# Patient Record
Sex: MALE | Race: BLACK OR AFRICAN AMERICAN | NOT HISPANIC OR LATINO | Employment: OTHER | URBAN - METROPOLITAN AREA
[De-identification: names, ages, dates, MRNs, and addresses within clinical notes are randomized per-mention and may not be internally consistent; named-entity substitution may affect disease eponyms.]

---

## 2022-06-28 ENCOUNTER — OFFICE VISIT (OUTPATIENT)
Dept: FAMILY MEDICINE | Facility: CLINIC | Age: 67
End: 2022-06-28
Payer: MEDICARE

## 2022-06-28 VITALS
BODY MASS INDEX: 23.01 KG/M2 | TEMPERATURE: 98 F | HEART RATE: 74 BPM | DIASTOLIC BLOOD PRESSURE: 80 MMHG | WEIGHT: 173.63 LBS | SYSTOLIC BLOOD PRESSURE: 132 MMHG | RESPIRATION RATE: 14 BRPM | HEIGHT: 73 IN | OXYGEN SATURATION: 98 %

## 2022-06-28 DIAGNOSIS — G57.03 PIRIFORMIS SYNDROME OF BOTH SIDES: ICD-10-CM

## 2022-06-28 DIAGNOSIS — Z11.59 ENCOUNTER FOR HEPATITIS C SCREENING TEST FOR LOW RISK PATIENT: ICD-10-CM

## 2022-06-28 DIAGNOSIS — Z76.89 ENCOUNTER TO ESTABLISH CARE: Primary | ICD-10-CM

## 2022-06-28 DIAGNOSIS — Z13.6 ENCOUNTER FOR SCREENING FOR CARDIOVASCULAR DISORDERS: ICD-10-CM

## 2022-06-28 DIAGNOSIS — Z11.4 SCREENING FOR HIV (HUMAN IMMUNODEFICIENCY VIRUS): ICD-10-CM

## 2022-06-28 DIAGNOSIS — Z12.5 SCREENING FOR PROSTATE CANCER: ICD-10-CM

## 2022-06-28 DIAGNOSIS — Z00.00 ANNUAL PHYSICAL EXAM: ICD-10-CM

## 2022-06-28 PROCEDURE — 1125F AMNT PAIN NOTED PAIN PRSNT: CPT | Mod: CPTII,S$GLB,, | Performed by: FAMILY MEDICINE

## 2022-06-28 PROCEDURE — 3075F SYST BP GE 130 - 139MM HG: CPT | Mod: CPTII,S$GLB,, | Performed by: FAMILY MEDICINE

## 2022-06-28 PROCEDURE — 3008F BODY MASS INDEX DOCD: CPT | Mod: CPTII,S$GLB,, | Performed by: FAMILY MEDICINE

## 2022-06-28 PROCEDURE — 1160F PR REVIEW ALL MEDS BY PRESCRIBER/CLIN PHARMACIST DOCUMENTED: ICD-10-PCS | Mod: CPTII,S$GLB,, | Performed by: FAMILY MEDICINE

## 2022-06-28 PROCEDURE — 3288F FALL RISK ASSESSMENT DOCD: CPT | Mod: CPTII,S$GLB,, | Performed by: FAMILY MEDICINE

## 2022-06-28 PROCEDURE — 3288F PR FALLS RISK ASSESSMENT DOCUMENTED: ICD-10-PCS | Mod: CPTII,S$GLB,, | Performed by: FAMILY MEDICINE

## 2022-06-28 PROCEDURE — 1159F PR MEDICATION LIST DOCUMENTED IN MEDICAL RECORD: ICD-10-PCS | Mod: CPTII,S$GLB,, | Performed by: FAMILY MEDICINE

## 2022-06-28 PROCEDURE — 1125F PR PAIN SEVERITY QUANTIFIED, PAIN PRESENT: ICD-10-PCS | Mod: CPTII,S$GLB,, | Performed by: FAMILY MEDICINE

## 2022-06-28 PROCEDURE — 3008F PR BODY MASS INDEX (BMI) DOCUMENTED: ICD-10-PCS | Mod: CPTII,S$GLB,, | Performed by: FAMILY MEDICINE

## 2022-06-28 PROCEDURE — 99387 INIT PM E/M NEW PAT 65+ YRS: CPT | Mod: S$GLB,,, | Performed by: FAMILY MEDICINE

## 2022-06-28 PROCEDURE — 1101F PR PT FALLS ASSESS DOC 0-1 FALLS W/OUT INJ PAST YR: ICD-10-PCS | Mod: CPTII,S$GLB,, | Performed by: FAMILY MEDICINE

## 2022-06-28 PROCEDURE — 3079F PR MOST RECENT DIASTOLIC BLOOD PRESSURE 80-89 MM HG: ICD-10-PCS | Mod: CPTII,S$GLB,, | Performed by: FAMILY MEDICINE

## 2022-06-28 PROCEDURE — 99387 PR PREVENTIVE VISIT,NEW,65 & OVER: ICD-10-PCS | Mod: S$GLB,,, | Performed by: FAMILY MEDICINE

## 2022-06-28 PROCEDURE — 3079F DIAST BP 80-89 MM HG: CPT | Mod: CPTII,S$GLB,, | Performed by: FAMILY MEDICINE

## 2022-06-28 PROCEDURE — 3075F PR MOST RECENT SYSTOLIC BLOOD PRESS GE 130-139MM HG: ICD-10-PCS | Mod: CPTII,S$GLB,, | Performed by: FAMILY MEDICINE

## 2022-06-28 PROCEDURE — 1160F RVW MEDS BY RX/DR IN RCRD: CPT | Mod: CPTII,S$GLB,, | Performed by: FAMILY MEDICINE

## 2022-06-28 PROCEDURE — 99999 PR PBB SHADOW E&M-NEW PATIENT-LVL IV: CPT | Mod: PBBFAC,,, | Performed by: FAMILY MEDICINE

## 2022-06-28 PROCEDURE — 99999 PR PBB SHADOW E&M-NEW PATIENT-LVL IV: ICD-10-PCS | Mod: PBBFAC,,, | Performed by: FAMILY MEDICINE

## 2022-06-28 PROCEDURE — 1159F MED LIST DOCD IN RCRD: CPT | Mod: CPTII,S$GLB,, | Performed by: FAMILY MEDICINE

## 2022-06-28 PROCEDURE — 1101F PT FALLS ASSESS-DOCD LE1/YR: CPT | Mod: CPTII,S$GLB,, | Performed by: FAMILY MEDICINE

## 2022-06-28 RX ORDER — MELOXICAM 15 MG/1
15 TABLET ORAL DAILY PRN
Qty: 30 TABLET | Refills: 1 | Status: SHIPPED | OUTPATIENT
Start: 2022-06-28 | End: 2022-08-01

## 2022-06-28 NOTE — PROGRESS NOTES
"[facility] - Clinic Note    Subjective      Mr. Leon is a 66 y.o. male who presents to clinic for Establish Care and Back Pain    Patient reports that for last 3 years he has had intermittent pain in his buttocks that radiates down to his legs.  Over the last couple months seems to be worsening.  In the morning his legs feel fine but by the afternoon he has more pain.  He has not taken anything to help with his symptoms.  He reports that he had a colonoscopy about 30 years ago.  He is not interested in that currently.    PMH Wing has no past medical history on file.   PSXH Wing has no past surgical history on file.   FH Wing's family history is not on file.   SH Wing reports that he has quit smoking. He has never used smokeless tobacco. No history on file for alcohol use and drug use.   ALG Wing has No Known Allergies.   MED Wing has a current medication list which includes the following prescription(s): meloxicam.     Review of Systems   Constitutional: Negative for chills and fever.   HENT: Negative for congestion and rhinorrhea.    Eyes: Negative for visual disturbance.   Respiratory: Negative for cough and shortness of breath.    Cardiovascular: Negative for chest pain.   Gastrointestinal: Negative for abdominal pain, constipation, diarrhea, nausea and vomiting.   Genitourinary: Negative for dysuria.   Musculoskeletal: Positive for back pain and myalgias.   Skin: Negative for rash.   Neurological: Negative for weakness and headaches.     Objective     /80 (BP Location: Left arm, Patient Position: Sitting, BP Method: Large (Manual))   Pulse 74   Temp 97.9 °F (36.6 °C) (Temporal)   Resp 14   Ht 6' 1" (1.854 m)   Wt 78.7 kg (173 lb 9.6 oz)   SpO2 98%   BMI 22.90 kg/m²     Physical Exam  Vitals and nursing note reviewed.   Constitutional:       General: He is not in acute distress.     Appearance: Normal appearance. He is well-developed. He is not diaphoretic.   HENT:      Head: " Normocephalic and atraumatic.      Right Ear: External ear normal.      Left Ear: External ear normal.   Eyes:      General:         Right eye: No discharge.         Left eye: No discharge.   Cardiovascular:      Rate and Rhythm: Normal rate and regular rhythm.      Heart sounds: Normal heart sounds.   Pulmonary:      Effort: Pulmonary effort is normal.      Breath sounds: Normal breath sounds. No wheezing or rales.   Skin:     General: Skin is warm and dry.   Neurological:      Mental Status: He is alert and oriented to person, place, and time. Mental status is at baseline.   Psychiatric:         Mood and Affect: Mood normal.         Behavior: Behavior normal.         Thought Content: Thought content normal.         Judgment: Judgment normal.        Assessment/Plan     1. Encounter to establish care     2. Annual physical exam  Lipid Panel    Hemoglobin A1C   3. Piriformis syndrome of both sides  meloxicam (MOBIC) 15 MG tablet    X-Ray Lumbar Spine AP And Lateral    Ambulatory referral/consult to Physical/Occupational Therapy    Comprehensive Metabolic Panel    CBC Auto Differential   4. Screening for prostate cancer  PSA, Screening   5. Encounter for hepatitis C screening test for low risk patient  Hepatitis C Antibody   6. Screening for HIV (human immunodeficiency virus)  HIV 1/2 Ag/Ab (4th Gen)   7. Encounter for screening for cardiovascular disorders   Lipid Panel     Will obtain x-ray of lumbar spine to evaluate.  Likely piriformis syndrome.  Exercise is shown to patient.  Meloxicam as needed.  Referral to physical therapy to help with symptoms.  Due for yearly lab work as above.  Follow-up in 1 year or earlier as needed.    Future Appointments   Date Time Provider Department Center   6/28/2022  9:30 AM [facility] XR1  FL1 [facility] XRAY [facility]         Will Amezcua MD  Family Medicine  [facility]

## 2022-06-29 ENCOUNTER — HOSPITAL ENCOUNTER (OUTPATIENT)
Dept: RADIOLOGY | Facility: HOSPITAL | Age: 67
Discharge: HOME | End: 2022-06-29
Attending: FAMILY MEDICINE
Payer: MEDICARE

## 2022-06-29 DIAGNOSIS — G57.03 PIRIFORMIS SYNDROME OF BOTH SIDES: ICD-10-CM

## 2022-06-29 PROCEDURE — 72100 X-RAY EXAM L-S SPINE 2/3 VWS: CPT | Mod: TC

## 2022-06-29 PROCEDURE — 72100 XR LUMBAR SPINE AP AND LATERAL: ICD-10-PCS | Mod: 26,,, | Performed by: RADIOLOGY

## 2022-06-29 PROCEDURE — 72100 X-RAY EXAM L-S SPINE 2/3 VWS: CPT | Mod: 26,,, | Performed by: RADIOLOGY

## 2022-07-04 DIAGNOSIS — E78.5 HYPERLIPIDEMIA, UNSPECIFIED HYPERLIPIDEMIA TYPE: Primary | ICD-10-CM

## 2022-07-04 RX ORDER — ATORVASTATIN CALCIUM 20 MG/1
20 TABLET, FILM COATED ORAL DAILY
Qty: 90 TABLET | Refills: 3 | Status: SHIPPED | OUTPATIENT
Start: 2022-07-04 | End: 2023-07-08

## 2022-07-08 ENCOUNTER — TELEPHONE (OUTPATIENT)
Dept: FAMILY MEDICINE | Facility: CLINIC | Age: 67
End: 2022-07-08
Payer: MEDICARE

## 2022-07-08 DIAGNOSIS — R73.03 PREDIABETES: Primary | ICD-10-CM

## 2022-07-08 NOTE — TELEPHONE ENCOUNTER
----- Message from Will Amezcua MD sent at 7/4/2022  5:36 PM CDT -----  Your cholesterol levels are elevated. This puts you at an increased risk for heart attacks and strokes.  In order to reduce this risk taking a cholesterol-lowering medication would be beneficial.  I have sent in a medication called atorvastatin which you take once a day. Your hemoglobin A1c was 5.8 which puts you in the prediabetic category meaning you are at risk for developing diabetes. In order to reduce this risk, you should make dietary changes (reducing carbohydrates, eating more vegetables, reducing processed foods and sugars, more protein rich meals, less saturated fats) and try to exercise more. There is a medication called metformin which would also help to lower this risk. Please let me know if you would like to try this medication. Your PSA screen for prostate cancer was negative.

## 2022-07-08 NOTE — TELEPHONE ENCOUNTER
Spoke to the pt and discussed lab results the pt would like to start the metformin for his diabetes risk.

## 2022-07-12 RX ORDER — METFORMIN HYDROCHLORIDE 500 MG/1
500 TABLET, EXTENDED RELEASE ORAL DAILY
Qty: 90 TABLET | Refills: 3 | Status: SHIPPED | OUTPATIENT
Start: 2022-07-12 | End: 2023-07-08

## 2022-07-20 ENCOUNTER — CLINICAL SUPPORT (OUTPATIENT)
Dept: REHABILITATION | Facility: HOSPITAL | Age: 67
End: 2022-07-20
Attending: FAMILY MEDICINE
Payer: MEDICARE

## 2022-07-20 DIAGNOSIS — G57.03 PIRIFORMIS SYNDROME OF BOTH SIDES: ICD-10-CM

## 2022-07-20 PROCEDURE — 97110 THERAPEUTIC EXERCISES: CPT

## 2022-07-20 PROCEDURE — 97162 PT EVAL MOD COMPLEX 30 MIN: CPT

## 2022-07-20 NOTE — PLAN OF CARE
Physical Therapy Initial Evaluation     Name: Wing [facility] Number: [id number]    Diagnosis:   Encounter Diagnosis   Name Primary?    Piriformis syndrome of both sides      Physician: Will Amezcua MD  Treatment Orders: PT Eval and Treat  No past medical history on file.  Current Outpatient Medications   Medication Sig    atorvastatin (LIPITOR) 20 MG tablet Take 1 tablet (20 mg total) by mouth once daily.    meloxicam (MOBIC) 15 MG tablet Take 1 tablet (15 mg total) by mouth daily as needed for Pain.    metFORMIN (GLUCOPHAGE-XR) 500 MG ER 24hr tablet Take 1 tablet (500 mg total) by mouth once daily at 6am.     No current facility-administered medications for this visit.     Review of patient's allergies indicates:  No Known Allergies    Subjective     Patient states:  He has a strain in his back and legs however does not have any idea how he caused it   Pain Scale: Wing rates pain on a scale of 0-10 to be 9 at worst; 1 currently; 1 at best .  Onset: does not know when.  Says he probably has had pain over the past 3 or 4 years  or so very non specific, not a good historian   Radicular symptoms:  Pain into BLE  Aggravating factors:   walking  Easing factors:  Sitting   Prior Therapy: none   Home Environment (Steps/Adaptations): lives alone, single story home with 4 steps   Functional Deficits Leading to Referral: states he is ind with adls and home chores, states he has difficulty walking   Prior functional status: na  DME owned/used: na  Occupation:  Retired                        Pts goals:  Walk pain free    Objective     Posture Alignment: none    LUMBAR SPINE AROM:   Flexion: wfl   Extension: wfl   Left Sidebend: wfl   Right Sidebend: wfl   Left Rotation: wfl   Right Rotation: wfl     SEGMENTAL MOBILITY: WFL    LOWER EXTREMITY STRENGTH:   Left Right   Quadriceps 5/5 5/5   Hamstrings 5/5 5/5     Iliopsoas 5/5 5/5   PGM 5/5 5/5   Hip IR 5/5  /   Hip ER    Hip Ext        DTR's:   Left Right   Patella Tendon 2+ 2+   Achilles Tendon 2+ 2+     Dermatomes: Sensation: Light Touch: Intact  Myotomes: na    FLEXIBILITY:normal     Special Tests:   Left Right   Slump - -   SLR - -   BKFO - -     GAIT: Wing ambulates with no assistive device with independently.     GAIT DEVIATIONS: Wing displays flexed posturing    Pt/family was provided educational information, including: role of PT, goals for PT, scheduling - pt verbalized understanding. Discussed insurance limitations with pt.     TREATMENT     Time In: 245  Time Out: 315    PT Evaluation Completed? Yes  Discussed Plan of Care with patient: Yes    Wing received 23 minutes of therapeutic exercise including:  nustep x 8  SKTC with sb x 40  Bridges with SB x 30  Piriformis stretch BLE 2 min  Hip ER stretch BLE 2 min  Wing received 0 minutes of manual therapy includin    Written Home Exercises Provided:   Wing demo poor understanding of the education provided. Patient demo poor return demo of skill of exercises.    Assessment     Patient is an elderly male with complaints of walking. He has pain into BLE with gait over 1/4 mile  He is ind with adls and chores with no pain . He has difficulty following instructions. Needs constant verbal and tactile cues for tasks   Pt prognosis is Fair.  Pt will benefit from skilled outpatient physical therapy to address the above stated deficits, provide pt/family education and to maximize pt's level of independence.     Medical necessity is demonstrated by the following IMPAIRMENTS/PROBLEMS:  1. Increased Pain  2. Decreased Segmental Mobility & Decreased ROM  3. Decreased Core & BLE strength  4. Decreased Flexibility BLE  5. Decreased Tolerance to Functional Activities    Pt's spiritual, cultural and educational needs considered and pt agreeable to plan of care and goals as stated below:     Anticipated Barriers for physical therapy: education      Short Term GOALS: 3 weeks. Pt agrees with goals set.  1. Patient demonstrates independence with HEP.   2. Patient demonstrates independence with Postural Awareness.   3. Patient demonstrates independence with body mechanics.     Long Term GOALS: 6 weeks. Pt agrees with goals set.  1. Patient demonstrates increased flexibility in piriformis  to wfl  to improve tolerance to functional activities.   2.Ind with HEP   3. Patient demonstrates improved overall function per Oswestry to 5% or less.         PLAN     Outpatient physical therapy 2 times weekly to include: pt ed, hep, therapeutic exercises, neuromuscular re-education/ balance exercises, joint mobilizations, aquatic therapy and modalities prn. Cont PT for  6 weeks. Pt may be seen by PTA as part of the rehabilitation team.     Therapist: Aviva Phillip, PT  7/20/2022

## 2022-07-27 DIAGNOSIS — Z12.11 COLON CANCER SCREENING: ICD-10-CM

## 2022-08-02 ENCOUNTER — CLINICAL SUPPORT (OUTPATIENT)
Dept: REHABILITATION | Facility: HOSPITAL | Age: 67
End: 2022-08-02
Attending: FAMILY MEDICINE
Payer: MEDICARE

## 2022-08-02 DIAGNOSIS — G57.03 PIRIFORMIS SYNDROME OF BOTH SIDES: Primary | ICD-10-CM

## 2022-08-02 PROCEDURE — 97110 THERAPEUTIC EXERCISES: CPT

## 2022-08-02 NOTE — PROGRESS NOTES
Physical Therapy Daily Note     Name: Wing [facility] Number: [id number]  Diagnosis:   Encounter Diagnosis   Name Primary?    Piriformis syndrome of both sides Yes     Physician: Will Amezcua MD  Precautions: none  Visit #: 2 of 12  PTA Visit #: 0  Time In: 1005  Time Out: 1050    Subjective     Pt reports: he still has difficulty walking in the evenings with pain   Pain Scale: Wing rates pain on a scale of 0-10 to be n/a currently.    Objective     Wing received individual therapeutic exercises to develop strength, endurance, ROM, flexibility, posture and core stabilization for 30 minutes including:  Nustep x 10  Leg press x 50 70#  Piriformis stretch 30 sec x 5 JAN  Clam shells x 40  Open books x 40   Hamstring stretch with strap   Step ups x 30      Wing received the following manual therapy techniques: Written Home Exercises Provided: 0  Pt demo good understanding of the education provided. Wing demonstrated good return demonstration of activities.     Education provided re:  Wing verbalized good understanding of education provided.   No spiritual or educational barriers to learning provided    Assessment     Patient tolerated stretches and active exercises with no adverse side effects   This is a 66 y.o. male referred to outpatient physical therapy and presents with a medical diagnosis of Bilateral piriformis syndrome  and demonstrates limitations as described in the problem list. Pt prognosis is Good. Pt will continue to benefit from skilled outpatient physical therapy to address the deficits listed in the problem list, provide pt/family education and to maximize pt's level of independence in the home and community environment.     Goals as follows:        Short Term GOALS: 3 weeks. Pt agrees with goals set.  1. Patient demonstrates independence with HEP.   2. Patient demonstrates independence with Postural Awareness.   3. Patient  demonstrates independence with body mechanics.      Long Term GOALS: 6 weeks. Pt agrees with goals set.  1. Patient demonstrates increased flexibility in piriformis  to wfl  to improve tolerance to functional activities.   2.Ind with HEP   3. Patient demonstrates improved overall function per Oswestry to 5% or less.         Plan     Continue with established Plan of Care towards PT goals.    Therapist: Aviva Phillip, PT  8/2/2022

## 2022-08-05 ENCOUNTER — CLINICAL SUPPORT (OUTPATIENT)
Dept: REHABILITATION | Facility: HOSPITAL | Age: 67
End: 2022-08-05
Payer: MEDICARE

## 2022-08-05 DIAGNOSIS — G57.03 PIRIFORMIS SYNDROME OF BOTH SIDES: Primary | ICD-10-CM

## 2022-08-05 PROCEDURE — 97110 THERAPEUTIC EXERCISES: CPT

## 2022-08-05 NOTE — PROGRESS NOTES
Physical Therapy Daily Note     Name: Wing [facility] Number: [id number]  Diagnosis:   Encounter Diagnosis   Name Primary?    Piriformis syndrome of both sides Yes     Physician: Will Amezcua MD  Precautions: none  Visit #: 3 of 12  PTA Visit #: 0  Time In: 1015  Time Out: 1045    Subjective     Pt reports: he still has difficulty walking in the evenings with pain   Pain Scale: Wing rates pain on a scale of 0-10 to be n/a currently.    Objective     Wing received individual therapeutic exercises to develop strength, endurance, ROM, flexibility, posture and core stabilization for 30 minutes including:  Nustep x 10  Leg press x 50 70#  Piriformis stretch 30 sec x 5 JAN  Clam shells x 40  Open books x 40   Hamstring stretch with strap   Step ups x 30  Seated ham curls x 50 jan GTB  Bridges with SB x 50]  LTR x 50 with SB  SB roll outs x 50    Wing received the following manual therapy techniques: Written Home Exercises Provided: 0  Pt demo good understanding of the education provided. Wing demonstrated good return demonstration of activities.     Education provided re:  Wing verbalized good understanding of education provided.   No spiritual or educational barriers to learning provided    Assessment     Patient tolerated stretches and active exercises with no adverse side effects   This is a 66 y.o. male referred to outpatient physical therapy and presents with a medical diagnosis of Bilateral piriformis syndrome  and demonstrates limitations as described in the problem list. Pt prognosis is Good. Pt will continue to benefit from skilled outpatient physical therapy to address the deficits listed in the problem list, provide pt/family education and to maximize pt's level of independence in the home and community environment.     Goals as follows:        Short Term GOALS: 3 weeks. Pt agrees with goals set.  1. Patient demonstrates independence  with HEP.   2. Patient demonstrates independence with Postural Awareness.   3. Patient demonstrates independence with body mechanics.      Long Term GOALS: 6 weeks. Pt agrees with goals set.  1. Patient demonstrates increased flexibility in piriformis  to wfl  to improve tolerance to functional activities.   2.Ind with HEP   3. Patient demonstrates improved overall function per Oswestry to 5% or less.         Plan     Continue with established Plan of Care towards PT goals.    Therapist: Aviva Phillip, PT  8/5/2022

## 2022-08-09 ENCOUNTER — CLINICAL SUPPORT (OUTPATIENT)
Dept: REHABILITATION | Facility: HOSPITAL | Age: 67
End: 2022-08-09
Payer: MEDICARE

## 2022-08-09 DIAGNOSIS — G57.03 PIRIFORMIS SYNDROME OF BOTH SIDES: Primary | ICD-10-CM

## 2022-08-09 PROCEDURE — 97110 THERAPEUTIC EXERCISES: CPT

## 2022-08-09 NOTE — PROGRESS NOTES
Physical Therapy Daily Note     Name: Wing [facility] Number: [id number]  Diagnosis:   Encounter Diagnosis   Name Primary?    Piriformis syndrome of both sides Yes     Physician: Will Amezcua MD  Precautions: none  Visit #: 4 of 12  PTA Visit #: 0  Time In: 915  Time Out: 945    Subjective     Pt reports: he still has difficulty walking in the evenings with pain   Continues to have piriformis syndrome, pain with walking   Pain Scale: Wing rates pain on a scale of 0-10 to be n/a currently.    Objective     Wing received individual therapeutic exercises to develop strength, endurance, ROM, flexibility, posture and core stabilization for 30 minutes including:  Nustep x 10  Leg press x 50 70#  Piriformis stretch 30 sec x 5 JAN  Clam shells x 40  Open books x 40   Hamstring stretch with strap   Step ups x 30  Seated ham curls x 50 jan GTB  Bridges with SB x 50]  LTR x 50 with SB  SB roll outs x 50    Wing received the following manual therapy techniques: Written Home Exercises Provided: 0  Pt demo good understanding of the education provided. Wing demonstrated good return demonstration of activities.     Education provided re:  Wing verbalized good understanding of education provided.   No spiritual or educational barriers to learning provided    Assessment     Patient tolerated stretches and active exercises with no adverse side effects   This is a 66 y.o. male referred to outpatient physical therapy and presents with a medical diagnosis of Bilateral piriformis syndrome  and demonstrates limitations as described in the problem list. Pt prognosis is Good. Pt will continue to benefit from skilled outpatient physical therapy to address the deficits listed in the problem list, provide pt/family education and to maximize pt's level of independence in the home and community environment.     Goals as follows:        Short Term GOALS: 3 weeks. Pt  agrees with goals set.  1. Patient demonstrates independence with HEP.   2. Patient demonstrates independence with Postural Awareness.   3. Patient demonstrates independence with body mechanics.      Long Term GOALS: 6 weeks. Pt agrees with goals set.  1. Patient demonstrates increased flexibility in piriformis  to wfl  to improve tolerance to functional activities.   2.Ind with HEP   3. Patient demonstrates improved overall function per Oswestry to 5% or less.         Plan     Continue with established Plan of Care towards PT goals.    Therapist: Aviva Phillip, PT  8/9/2022

## 2022-08-10 ENCOUNTER — PATIENT MESSAGE (OUTPATIENT)
Dept: ADMINISTRATIVE | Facility: HOSPITAL | Age: 67
End: 2022-08-10
Payer: MEDICARE

## 2022-08-11 ENCOUNTER — CLINICAL SUPPORT (OUTPATIENT)
Dept: REHABILITATION | Facility: HOSPITAL | Age: 67
End: 2022-08-11
Payer: MEDICARE

## 2022-08-11 DIAGNOSIS — G57.03 PIRIFORMIS SYNDROME OF BOTH SIDES: Primary | ICD-10-CM

## 2022-08-11 PROCEDURE — 97110 THERAPEUTIC EXERCISES: CPT

## 2022-08-11 NOTE — PROGRESS NOTES
Physical Therapy Daily Note     Name: Wing [facility] Number: [id number]  Diagnosis:   Encounter Diagnosis   Name Primary?    Piriformis syndrome of both sides Yes     Physician: Will Amezcua MD  Precautions: none  Visit #: 4 of 12  PTA Visit #: 0  Time In: 925  Time Out: 1005    Subjective     Pt reports: he still has difficulty walking in the evenings with pain   Continues to have piriformis syndrome, pain with walking   Pain Scale: Wing rates pain on a scale of 0-10 to be n/a currently.    Objective     Wing received individual therapeutic exercises to develop strength, endurance, ROM, flexibility, posture and core stabilization for 30 minutes including:  Nustep x 10  Leg press x 50 70#  Piriformis stretch 30 sec x 5 JAN  Clam shells x 40  Open books x 40   Hamstring stretch with strap   Step ups x 30  Seated ham curls x 50 jan GTB  Bridges with SB x 50]  LTR x 50 with SB  SB roll outs x 50  Mini squats x 40  Standing hip abd x 40  Toe raises x 40  DKTC with SB x 50    Wing received the following manual therapy techniques: Written Home Exercises Provided: 0  Pt demo good understanding of the education provided. Wing demonstrated good return demonstration of activities.     Education provided re:  Wing verbalized good understanding of education provided.   No spiritual or educational barriers to learning provided    Assessment     Patient tolerated stretches and active exercises with no adverse side effects   This is a 66 y.o. male referred to outpatient physical therapy and presents with a medical diagnosis of Bilateral piriformis syndrome  and demonstrates limitations as described in the problem list. Pt prognosis is Good. Pt will continue to benefit from skilled outpatient physical therapy to address the deficits listed in the problem list, provide pt/family education and to maximize pt's level of independence in the home and  community environment.   Pt making slow progress toward goals. He continues to have pain with walks in the evening  He is IND with stretches and HEP  He may be approaching his max potential     Goals as follows:        Short Term GOALS: 3 weeks. Pt agrees with goals set.  1. Patient demonstrates independence with HEP.   2. Patient demonstrates independence with Postural Awareness.   3. Patient demonstrates independence with body mechanics.      Long Term GOALS: 6 weeks. Pt agrees with goals set.  1. Patient demonstrates increased flexibility in piriformis  to wfl  to improve tolerance to functional activities.   2.Ind with HEP   3. Patient demonstrates improved overall function per Oswestry to 5% or less.         Plan     Continue with established Plan of Care towards PT goals.    Therapist: Aviva Phillip, PT  8/11/2022

## 2022-08-16 ENCOUNTER — CLINICAL SUPPORT (OUTPATIENT)
Dept: REHABILITATION | Facility: HOSPITAL | Age: 67
End: 2022-08-16
Payer: MEDICARE

## 2022-08-16 DIAGNOSIS — G57.03 PIRIFORMIS SYNDROME OF BOTH SIDES: Primary | ICD-10-CM

## 2022-08-16 PROCEDURE — 97110 THERAPEUTIC EXERCISES: CPT

## 2022-08-16 NOTE — PROGRESS NOTES
Physical Therapy Daily Note     Name: Wing [facility] Number: [id number]  Diagnosis:   Encounter Diagnosis   Name Primary?    Piriformis syndrome of both sides Yes     Physician: Will Amezcua MD  Precautions: none  Visit #: 6 of 12  PTA Visit #: 0  Time In: 905  Time Out: 950    Subjective     Pt reports: he still has difficulty walking in the evenings with pain   Continues to have piriformis syndrome, pain with walking   Pain Scale: Wing rates pain on a scale of 0-10 to be n/a currently.    Objective     Wing received individual therapeutic exercises to develop strength, endurance, ROM, flexibility, posture and core stabilization for 30 minutes including:  Nustep x 10  Leg press x 50 70#  Piriformis stretch 30 sec x 5 JAN  Clam shells x 40  Open books x 40   Hamstring stretch with strap   Step ups x 30  Seated ham curls x 50 jan GTB  Bridges with SB x 50]  LTR x 50 with SB  SB roll outs x 50  Mini squats x 40  Standing hip abd x 40  Toe raises x 40  DKTC with SB x 50    Wing received the following manual therapy techniques: Written Home Exercises Provided: 0  Pt demo good understanding of the education provided. Wing demonstrated good return demonstration of activities.     Education provided re:  Wing verbalized good understanding of education provided.   No spiritual or educational barriers to learning provided    Assessment     Patient tolerated stretches and active exercises with no adverse side effects   This is a 66 y.o. male referred to outpatient physical therapy and presents with a medical diagnosis of Bilateral piriformis syndrome  and demonstrates limitations as described in the problem list. Pt prognosis is Good. Pt will continue to benefit from skilled outpatient physical therapy to address the deficits listed in the problem list, provide pt/family education and to maximize pt's level of independence in the home and  community environment.   Pt making slow progress toward goals. He continues to have pain with walks in the evening  He is IND with stretches and HEP  He may be approaching his max potential     Goals as follows:        Short Term GOALS: 3 weeks. Pt agrees with goals set.  1. Patient demonstrates independence with HEP.   2. Patient demonstrates independence with Postural Awareness.   3. Patient demonstrates independence with body mechanics.      Long Term GOALS: 6 weeks. Pt agrees with goals set.  1. Patient demonstrates increased flexibility in piriformis  to wfl  to improve tolerance to functional activities.   2.Ind with HEP   3. Patient demonstrates improved overall function per Oswestry to 5% or less.         Plan     Continue with established Plan of Care towards PT goals.    Therapist: Aviva Phillip, PT  8/16/2022

## 2022-08-18 ENCOUNTER — CLINICAL SUPPORT (OUTPATIENT)
Dept: REHABILITATION | Facility: HOSPITAL | Age: 67
End: 2022-08-18
Payer: MEDICARE

## 2022-08-18 DIAGNOSIS — G57.03 PIRIFORMIS SYNDROME OF BOTH SIDES: Primary | ICD-10-CM

## 2022-08-18 PROCEDURE — 97110 THERAPEUTIC EXERCISES: CPT

## 2022-08-18 NOTE — PROGRESS NOTES
Physical Therapy Daily Note     Name: [facility] Number: [id number]  Diagnosis:   Encounter Diagnosis   Name Primary?    Piriformis syndrome of both sides Yes     Physician: Will Amezcua MD  Precautions: none  Visit #: 6 of 12  PTA Visit #: 0  Time In: 905  Time Out: 945    Subjective     Pt reports: he still has difficulty walking in the evenings with pain   Continues to have piriformis syndrome, pain with walking   Pain Scale: Wing rates pain on a scale of 0-10 to be n/a currently.    Objective     Wing received individual therapeutic exercises to develop strength, endurance, ROM, flexibility, posture and core stabilization for 38 minutes including:  Nustep x 10  Leg press x 50 70#  Piriformis stretch 30 sec x 5 JAN  Clam shells x 40  Open books x 40   Hamstring stretch with strap   Step ups x 30  Seated ham curls x 50 jan GTB  Bridges with SB x 50]  LTR x 50 with SB  SB roll outs x 50  Mini squats x 40  Standing hip abd x 40  Toe raises x 40  DKTC with SB x 50  SLR x 40      Wing received the following manual therapy techniques: Written Home Exercises Provided: 0  Pt demo good understanding of the education provided. Wing demonstrated good return demonstration of activities.     Education provided re:  Wing verbalized good understanding of education provided.   No spiritual or educational barriers to learning provided    Assessment     Patient tolerated stretches and active exercises with no adverse side effects   This is a 66 y.o. male referred to outpatient physical therapy and presents with a medical diagnosis of Bilateral piriformis syndrome  and demonstrates limitations as described in the problem list. Pt prognosis is Good. Pt will continue to benefit from skilled outpatient physical therapy to address the deficits listed in the problem list, provide pt/family education and to maximize pt's level of independence in the home  and community environment.   Pt making slow progress toward goals. He continues to have pain with walks in the evening  He is IND with stretches and HEP  He may be approaching his max potential     Goals as follows:        Short Term GOALS: 3 weeks. Pt agrees with goals set.  1. Patient demonstrates independence with HEP. MET  2. Patient demonstrates independence with Postural Awareness. MET  3. Patient demonstrates independence with body mechanics. MET     Long Term GOALS: 6 weeks. Pt agrees with goals set.  1. Patient demonstrates increased flexibility in piriformis  to wfl  to improve tolerance to functional activities. MET   2.Ind with HEP MET  3. Patient demonstrates improved overall function per Oswestry to 5% or less. MET        Plan     Discharge from PT   Goals met   Therapist: Aviva Phillip, PT  8/18/2022

## 2022-09-28 DIAGNOSIS — G57.03 PIRIFORMIS SYNDROME OF BOTH SIDES: ICD-10-CM

## 2022-09-28 RX ORDER — MELOXICAM 15 MG/1
TABLET ORAL
Qty: 30 TABLET | Refills: 1 | Status: SHIPPED | OUTPATIENT
Start: 2022-09-28 | End: 2022-11-29

## 2023-05-04 DIAGNOSIS — G57.03 PIRIFORMIS SYNDROME OF BOTH SIDES: ICD-10-CM

## 2023-05-07 RX ORDER — MELOXICAM 15 MG/1
TABLET ORAL
Qty: 30 TABLET | Refills: 0 | Status: SHIPPED | OUTPATIENT
Start: 2023-05-07 | End: 2023-06-05

## 2023-06-02 DIAGNOSIS — G57.03 PIRIFORMIS SYNDROME OF BOTH SIDES: ICD-10-CM

## 2023-06-05 RX ORDER — MELOXICAM 15 MG/1
TABLET ORAL
Qty: 30 TABLET | Refills: 0 | Status: SHIPPED | OUTPATIENT
Start: 2023-06-05 | End: 2023-08-23

## 2023-06-06 ENCOUNTER — OFFICE VISIT (OUTPATIENT)
Dept: FAMILY MEDICINE | Facility: CLINIC | Age: 68
End: 2023-06-06
Payer: MEDICARE

## 2023-06-06 VITALS
HEART RATE: 76 BPM | WEIGHT: 169.38 LBS | HEIGHT: 73 IN | RESPIRATION RATE: 16 BRPM | SYSTOLIC BLOOD PRESSURE: 126 MMHG | OXYGEN SATURATION: 98 % | TEMPERATURE: 98 F | DIASTOLIC BLOOD PRESSURE: 74 MMHG | BODY MASS INDEX: 22.45 KG/M2

## 2023-06-06 DIAGNOSIS — Z12.5 SCREENING FOR MALIGNANT NEOPLASM OF PROSTATE: ICD-10-CM

## 2023-06-06 DIAGNOSIS — Z12.11 SCREENING FOR COLON CANCER: ICD-10-CM

## 2023-06-06 DIAGNOSIS — G47.00 INSOMNIA, UNSPECIFIED TYPE: ICD-10-CM

## 2023-06-06 DIAGNOSIS — I73.9 PAD (PERIPHERAL ARTERY DISEASE): ICD-10-CM

## 2023-06-06 DIAGNOSIS — Z13.6 SCREENING FOR AAA (ABDOMINAL AORTIC ANEURYSM): ICD-10-CM

## 2023-06-06 DIAGNOSIS — Z00.00 ANNUAL PHYSICAL EXAM: Primary | ICD-10-CM

## 2023-06-06 DIAGNOSIS — M54.16 LUMBAR RADICULOPATHY, CHRONIC: ICD-10-CM

## 2023-06-06 DIAGNOSIS — E78.5 HYPERLIPIDEMIA, UNSPECIFIED HYPERLIPIDEMIA TYPE: ICD-10-CM

## 2023-06-06 DIAGNOSIS — R73.03 PREDIABETES: ICD-10-CM

## 2023-06-06 PROCEDURE — 1159F PR MEDICATION LIST DOCUMENTED IN MEDICAL RECORD: ICD-10-PCS | Mod: CPTII,S$GLB,, | Performed by: FAMILY MEDICINE

## 2023-06-06 PROCEDURE — 1159F MED LIST DOCD IN RCRD: CPT | Mod: CPTII,S$GLB,, | Performed by: FAMILY MEDICINE

## 2023-06-06 PROCEDURE — 99397 PER PM REEVAL EST PAT 65+ YR: CPT | Mod: S$GLB,,, | Performed by: FAMILY MEDICINE

## 2023-06-06 PROCEDURE — 1125F PR PAIN SEVERITY QUANTIFIED, PAIN PRESENT: ICD-10-PCS | Mod: CPTII,S$GLB,, | Performed by: FAMILY MEDICINE

## 2023-06-06 PROCEDURE — 3008F BODY MASS INDEX DOCD: CPT | Mod: CPTII,S$GLB,, | Performed by: FAMILY MEDICINE

## 2023-06-06 PROCEDURE — 1125F AMNT PAIN NOTED PAIN PRSNT: CPT | Mod: CPTII,S$GLB,, | Performed by: FAMILY MEDICINE

## 2023-06-06 PROCEDURE — 99397 PR PREVENTIVE VISIT,EST,65 & OVER: ICD-10-PCS | Mod: S$GLB,,, | Performed by: FAMILY MEDICINE

## 2023-06-06 PROCEDURE — 3008F PR BODY MASS INDEX (BMI) DOCUMENTED: ICD-10-PCS | Mod: CPTII,S$GLB,, | Performed by: FAMILY MEDICINE

## 2023-06-06 PROCEDURE — 3078F PR MOST RECENT DIASTOLIC BLOOD PRESSURE < 80 MM HG: ICD-10-PCS | Mod: CPTII,S$GLB,, | Performed by: FAMILY MEDICINE

## 2023-06-06 PROCEDURE — 1160F RVW MEDS BY RX/DR IN RCRD: CPT | Mod: CPTII,S$GLB,, | Performed by: FAMILY MEDICINE

## 2023-06-06 PROCEDURE — 3288F FALL RISK ASSESSMENT DOCD: CPT | Mod: CPTII,S$GLB,, | Performed by: FAMILY MEDICINE

## 2023-06-06 PROCEDURE — 99999 PR PBB SHADOW E&M-EST. PATIENT-LVL IV: ICD-10-PCS | Mod: PBBFAC,,, | Performed by: FAMILY MEDICINE

## 2023-06-06 PROCEDURE — 3074F SYST BP LT 130 MM HG: CPT | Mod: CPTII,S$GLB,, | Performed by: FAMILY MEDICINE

## 2023-06-06 PROCEDURE — 99999 PR PBB SHADOW E&M-EST. PATIENT-LVL IV: CPT | Mod: PBBFAC,,, | Performed by: FAMILY MEDICINE

## 2023-06-06 PROCEDURE — 3074F PR MOST RECENT SYSTOLIC BLOOD PRESSURE < 130 MM HG: ICD-10-PCS | Mod: CPTII,S$GLB,, | Performed by: FAMILY MEDICINE

## 2023-06-06 PROCEDURE — 1101F PT FALLS ASSESS-DOCD LE1/YR: CPT | Mod: CPTII,S$GLB,, | Performed by: FAMILY MEDICINE

## 2023-06-06 PROCEDURE — 3288F PR FALLS RISK ASSESSMENT DOCUMENTED: ICD-10-PCS | Mod: CPTII,S$GLB,, | Performed by: FAMILY MEDICINE

## 2023-06-06 PROCEDURE — 1101F PR PT FALLS ASSESS DOC 0-1 FALLS W/OUT INJ PAST YR: ICD-10-PCS | Mod: CPTII,S$GLB,, | Performed by: FAMILY MEDICINE

## 2023-06-06 PROCEDURE — 3078F DIAST BP <80 MM HG: CPT | Mod: CPTII,S$GLB,, | Performed by: FAMILY MEDICINE

## 2023-06-06 PROCEDURE — 1160F PR REVIEW ALL MEDS BY PRESCRIBER/CLIN PHARMACIST DOCUMENTED: ICD-10-PCS | Mod: CPTII,S$GLB,, | Performed by: FAMILY MEDICINE

## 2023-06-06 RX ORDER — TRAZODONE HYDROCHLORIDE 100 MG/1
100 TABLET ORAL NIGHTLY
Qty: 30 TABLET | Refills: 11 | Status: SHIPPED | OUTPATIENT
Start: 2023-06-06 | End: 2024-06-05

## 2023-06-06 NOTE — PROGRESS NOTES
"[facility] - Clinic Note    Subjective      Mr. Leon is a 67 y.o. male who presents to clinic for Annual Exam (refills) and Leg Pain (Bilateral legs)    Patient reports that for last 3 years he has had intermittent pain in his buttocks that radiates down to his legs.  Over the last couple months seems to be worsening.  In the morning his legs feel fine but by the afternoon he has more pain.  He tried physical therapy which did not help.  He has been taking meloxicam which has not been helpful.  Had a Quantiflo done which showed decreased blood flow in his legs.    PMH Wing has no past medical history on file.   PSXH Wing has no past surgical history on file.   FH Wing's family history is not on file.   SH Wing reports that he has quit smoking. He has never used smokeless tobacco. No history on file for alcohol use and drug use.   ALG Wing has No Known Allergies.   MED Wing has a current medication list which includes the following prescription(s): atorvastatin, meloxicam, metformin, and trazodone.     Review of Systems   Constitutional:  Negative for chills and fever.   HENT:  Negative for congestion and rhinorrhea.    Eyes:  Negative for visual disturbance.   Respiratory:  Negative for cough and shortness of breath.    Cardiovascular:  Negative for chest pain.   Gastrointestinal:  Negative for abdominal pain, constipation, diarrhea, nausea and vomiting.   Genitourinary:  Negative for dysuria.   Musculoskeletal:  Positive for back pain and myalgias.   Skin:  Negative for rash.   Neurological:  Negative for weakness and headaches.   Objective     /74 (BP Location: Left arm, Patient Position: Sitting, BP Method: Large (Manual))   Pulse 76   Temp 97.9 °F (36.6 °C) (Temporal)   Resp 16   Ht 6' 1" (1.854 m)   Wt 76.8 kg (169 lb 6.4 oz)   SpO2 98%   BMI 22.35 kg/m²     Physical Exam  Vitals and nursing note reviewed.   Constitutional:       General: He is not in acute distress.     " Appearance: Normal appearance. He is well-developed. He is not diaphoretic.   HENT:      Head: Normocephalic and atraumatic.      Right Ear: External ear normal.      Left Ear: External ear normal.   Eyes:      General:         Right eye: No discharge.         Left eye: No discharge.   Cardiovascular:      Rate and Rhythm: Normal rate and regular rhythm.      Heart sounds: Normal heart sounds.   Pulmonary:      Effort: Pulmonary effort is normal.      Breath sounds: Normal breath sounds. No wheezing or rales.   Skin:     General: Skin is warm and dry.   Neurological:      Mental Status: He is alert and oriented to person, place, and time. Mental status is at baseline.   Psychiatric:         Mood and Affect: Mood normal.         Behavior: Behavior normal.         Thought Content: Thought content normal.         Judgment: Judgment normal.      Assessment/Plan     1. Annual physical exam        2. Lumbar radiculopathy, chronic  MRI Lumbar Spine Without Contrast      3. Hyperlipidemia, unspecified hyperlipidemia type  Lipid Panel      4. Prediabetes  Comprehensive Metabolic Panel    CBC Auto Differential    Hemoglobin A1C      5. Screening for malignant neoplasm of prostate  PSA, Screening      6. Screening for colon cancer  Ambulatory referral/consult to Gastroenterology      7. Insomnia, unspecified type  traZODone (DESYREL) 100 MG tablet      8. Screening for AAA (abdominal aortic aneurysm)  US Abdominal Aorta      9. PAD (peripheral artery disease)          Given persistent pain and numbness and tingling in his lower extremity particularly his left leg will obtain MRI of lumbar spine to evaluate.  Due for yearly labs as above.  Trial trazodone for insomnia.  Also due for ultrasound of the abdominal aorta.  Recommended continuing statin plus adding baby aspirin to help with peripheral artery disease.  Referral to GI has been placed for colon cancer screening.    Future Appointments   Date Time Provider Department  Center   6/13/2023  1:00 PM [facility] MRI1 350 LB LIMIT [facility] MRI [facility]   6/20/2023  9:15 AM [facility] US1 [facility] US [facility]   8/2/2023  2:00 PM Mark Solitario MD [facility] GASTRO [facility]   9/14/2023  8:20 AM Will Amezcua MD [facility]         Will Amezcua MD  Family Medicine  [facility]

## 2023-06-08 ENCOUNTER — LAB VISIT (OUTPATIENT)
Dept: LAB | Facility: HOSPITAL | Age: 68
End: 2023-06-08
Attending: FAMILY MEDICINE
Payer: MEDICARE

## 2023-06-08 DIAGNOSIS — Z12.5 SCREENING FOR MALIGNANT NEOPLASM OF PROSTATE: ICD-10-CM

## 2023-06-08 DIAGNOSIS — R73.03 PREDIABETES: ICD-10-CM

## 2023-06-08 DIAGNOSIS — E78.5 HYPERLIPIDEMIA, UNSPECIFIED HYPERLIPIDEMIA TYPE: ICD-10-CM

## 2023-06-08 LAB
ALBUMIN SERPL BCP-MCNC: 4 G/DL (ref 3.5–5.2)
ALP SERPL-CCNC: 77 U/L (ref 55–135)
ALT SERPL W/O P-5'-P-CCNC: 15 U/L (ref 10–44)
ANION GAP SERPL CALC-SCNC: 10 MMOL/L (ref 8–16)
AST SERPL-CCNC: 11 U/L (ref 10–40)
BASOPHILS # BLD AUTO: 0.02 K/UL (ref 0–0.2)
BASOPHILS NFR BLD: 0.4 % (ref 0–1.9)
BILIRUB SERPL-MCNC: 0.6 MG/DL (ref 0.1–1)
BUN SERPL-MCNC: 8 MG/DL (ref 8–23)
CALCIUM SERPL-MCNC: 9.5 MG/DL (ref 8.7–10.5)
CHLORIDE SERPL-SCNC: 108 MMOL/L (ref 95–110)
CHOLEST SERPL-MCNC: 146 MG/DL (ref 120–199)
CHOLEST/HDLC SERPL: 2.6 {RATIO} (ref 2–5)
CO2 SERPL-SCNC: 24 MMOL/L (ref 23–29)
COMPLEXED PSA SERPL-MCNC: 1 NG/ML (ref 0–4)
CREAT SERPL-MCNC: 1 MG/DL (ref 0.5–1.4)
DIFFERENTIAL METHOD: ABNORMAL
EOSINOPHIL # BLD AUTO: 0.1 K/UL (ref 0–0.5)
EOSINOPHIL NFR BLD: 2.5 % (ref 0–8)
ERYTHROCYTE [DISTWIDTH] IN BLOOD BY AUTOMATED COUNT: 13.1 % (ref 11.5–14.5)
EST. GFR  (NO RACE VARIABLE): >60 ML/MIN/1.73 M^2
ESTIMATED AVG GLUCOSE: 111 MG/DL (ref 68–131)
GLUCOSE SERPL-MCNC: 96 MG/DL (ref 70–110)
HBA1C MFR BLD: 5.5 % (ref 4–5.6)
HCT VFR BLD AUTO: 43 % (ref 40–54)
HDLC SERPL-MCNC: 57 MG/DL (ref 40–75)
HDLC SERPL: 39 % (ref 20–50)
HGB BLD-MCNC: 15.1 G/DL (ref 14–18)
IMM GRANULOCYTES # BLD AUTO: 0.05 K/UL (ref 0–0.04)
IMM GRANULOCYTES NFR BLD AUTO: 0.9 % (ref 0–0.5)
LDLC SERPL CALC-MCNC: 74.6 MG/DL (ref 63–159)
LYMPHOCYTES # BLD AUTO: 2.7 K/UL (ref 1–4.8)
LYMPHOCYTES NFR BLD: 49 % (ref 18–48)
MCH RBC QN AUTO: 30.9 PG (ref 27–31)
MCHC RBC AUTO-ENTMCNC: 35.1 G/DL (ref 32–36)
MCV RBC AUTO: 88 FL (ref 82–98)
MONOCYTES # BLD AUTO: 0.6 K/UL (ref 0.3–1)
MONOCYTES NFR BLD: 10.5 % (ref 4–15)
NEUTROPHILS # BLD AUTO: 2 K/UL (ref 1.8–7.7)
NEUTROPHILS NFR BLD: 36.7 % (ref 38–73)
NONHDLC SERPL-MCNC: 89 MG/DL
NRBC BLD-RTO: 0 /100 WBC
PLATELET # BLD AUTO: 141 K/UL (ref 150–450)
PMV BLD AUTO: 9.5 FL (ref 9.2–12.9)
POTASSIUM SERPL-SCNC: 4.1 MMOL/L (ref 3.5–5.1)
PROT SERPL-MCNC: 7.4 G/DL (ref 6–8.4)
RBC # BLD AUTO: 4.88 M/UL (ref 4.6–6.2)
SODIUM SERPL-SCNC: 142 MMOL/L (ref 136–145)
TRIGL SERPL-MCNC: 72 MG/DL (ref 30–150)
WBC # BLD AUTO: 5.55 K/UL (ref 3.9–12.7)

## 2023-06-08 PROCEDURE — 80061 LIPID PANEL: CPT | Performed by: FAMILY MEDICINE

## 2023-06-08 PROCEDURE — 83036 HEMOGLOBIN GLYCOSYLATED A1C: CPT | Performed by: FAMILY MEDICINE

## 2023-06-08 PROCEDURE — 85025 COMPLETE CBC W/AUTO DIFF WBC: CPT | Performed by: FAMILY MEDICINE

## 2023-06-08 PROCEDURE — 36415 COLL VENOUS BLD VENIPUNCTURE: CPT | Performed by: FAMILY MEDICINE

## 2023-06-08 PROCEDURE — 80053 COMPREHEN METABOLIC PANEL: CPT | Performed by: FAMILY MEDICINE

## 2023-06-08 PROCEDURE — 84153 ASSAY OF PSA TOTAL: CPT | Performed by: FAMILY MEDICINE

## 2023-06-13 ENCOUNTER — HOSPITAL ENCOUNTER (OUTPATIENT)
Dept: RADIOLOGY | Facility: HOSPITAL | Age: 68
Discharge: HOME | End: 2023-06-13
Attending: FAMILY MEDICINE
Payer: MEDICARE

## 2023-06-13 DIAGNOSIS — M54.16 LUMBAR RADICULOPATHY, CHRONIC: ICD-10-CM

## 2023-06-13 PROCEDURE — 72148 MRI LUMBAR SPINE W/O DYE: CPT | Mod: TC

## 2023-06-13 PROCEDURE — 72148 MRI LUMBAR SPINE W/O DYE: CPT | Mod: 26,,, | Performed by: RADIOLOGY

## 2023-06-13 PROCEDURE — 72148 MRI LUMBAR SPINE WITHOUT CONTRAST: ICD-10-PCS | Mod: 26,,, | Performed by: RADIOLOGY

## 2023-06-14 ENCOUNTER — PATIENT MESSAGE (OUTPATIENT)
Dept: FAMILY MEDICINE | Facility: CLINIC | Age: 68
End: 2023-06-14
Payer: MEDICARE

## 2023-06-14 NOTE — TELEPHONE ENCOUNTER
Please notify pt NP checking MD results d/t being on vacation. MRI abnormal and I feel pt needs surgical eval. Would he like a referral or an appt to come back and discuss with PCP more? Findings are all chronic. Thanks, Vanesa

## 2023-06-20 ENCOUNTER — HOSPITAL ENCOUNTER (OUTPATIENT)
Dept: RADIOLOGY | Facility: HOSPITAL | Age: 68
Discharge: HOME | End: 2023-06-20
Attending: FAMILY MEDICINE
Payer: MEDICARE

## 2023-06-20 DIAGNOSIS — Z13.6 SCREENING FOR AAA (ABDOMINAL AORTIC ANEURYSM): ICD-10-CM

## 2023-06-20 PROCEDURE — 76775 US EXAM ABDO BACK WALL LIM: CPT | Mod: TC

## 2023-06-20 PROCEDURE — 76775 US ABDOMINAL AORTA: ICD-10-PCS | Mod: 26,,, | Performed by: RADIOLOGY

## 2023-06-20 PROCEDURE — 76775 US EXAM ABDO BACK WALL LIM: CPT | Mod: 26,,, | Performed by: RADIOLOGY

## 2023-06-21 ENCOUNTER — TELEPHONE (OUTPATIENT)
Dept: FAMILY MEDICINE | Facility: CLINIC | Age: 68
End: 2023-06-21
Payer: MEDICARE

## 2023-06-21 DIAGNOSIS — I73.9 PERIPHERAL VASCULAR DISEASE, UNSPECIFIED: Primary | ICD-10-CM

## 2023-06-21 NOTE — TELEPHONE ENCOUNTER
----- Message from Iqra Talihina sent at 6/21/2023  7:35 AM CDT -----  Contact: self  Type:  Needs Medical Advice    Who Called: self    Would the patient rather a call back or a response via [facility]? call  Best Call Back Number: [phone] (home)     Additional Information: pt needs to know what he should do since he has a blood clot. Pt sts he is calling about maybe scheduling a CT scan, please advise and thank you.

## 2023-06-23 DIAGNOSIS — M54.16 LUMBAR RADICULOPATHY, CHRONIC: Primary | ICD-10-CM

## 2023-06-26 ENCOUNTER — TELEPHONE (OUTPATIENT)
Dept: FAMILY MEDICINE | Facility: CLINIC | Age: 68
End: 2023-06-26
Payer: MEDICARE

## 2023-06-26 NOTE — TELEPHONE ENCOUNTER
----- Message from Iqra Mccann sent at 6/26/2023  2:42 PM CDT -----  Contact: self  Type:  Needs Medical Advice    Who Called: self  Would the patient rather a call back or a response via [facility]? call  Best Call Back Number: [phone] (home)     Additional Information: pt sts he needs to know the next steps since his appt with Dr. Esquivel was canceled due to dr not specializing in his particular problem. Please advise and thank you.

## 2023-06-28 ENCOUNTER — HOSPITAL ENCOUNTER (OUTPATIENT)
Dept: RADIOLOGY | Facility: HOSPITAL | Age: 68
Discharge: HOME | End: 2023-06-28
Attending: FAMILY MEDICINE
Payer: MEDICARE

## 2023-06-28 DIAGNOSIS — I73.9 PERIPHERAL VASCULAR DISEASE, UNSPECIFIED: ICD-10-CM

## 2023-06-28 PROCEDURE — 75635 CT ANGIO ABDOMINAL ARTERIES: CPT | Mod: TC

## 2023-06-28 PROCEDURE — 75635 CTA RUNOFF ABD PEL BILAT LOWER EXT: ICD-10-PCS | Mod: 26,,, | Performed by: RADIOLOGY

## 2023-06-28 PROCEDURE — 25500020 PHARM REV CODE 255: Performed by: FAMILY MEDICINE

## 2023-06-28 PROCEDURE — 75635 CT ANGIO ABDOMINAL ARTERIES: CPT | Mod: 26,,, | Performed by: RADIOLOGY

## 2023-06-28 RX ADMIN — IOHEXOL 100 ML: 350 INJECTION, SOLUTION INTRAVENOUS at 09:06

## 2023-06-30 DIAGNOSIS — I74.5 OCCLUSION OF ILIAC ARTERY: Primary | ICD-10-CM

## 2023-07-03 DIAGNOSIS — I74.5 OCCLUSION OF ILIAC ARTERY: Primary | ICD-10-CM

## 2023-07-19 ENCOUNTER — TELEPHONE (OUTPATIENT)
Dept: FAMILY MEDICINE | Facility: CLINIC | Age: 68
End: 2023-07-19
Payer: MEDICARE

## 2023-07-19 NOTE — TELEPHONE ENCOUNTER
----- Message from Trang Fong sent at 7/19/2023 10:36 AM CDT -----  Type:  Sooner Appointment Request    Caller is requesting a sooner appointment.      Name of Caller:  Lito    Best Call Back Number:  [phone]    Additional Information:  Pt is calling because he would like to reschedule once his new insurance is in affect. Please call back to advise. Thanks!

## 2023-08-01 ENCOUNTER — TELEPHONE (OUTPATIENT)
Dept: GASTROENTEROLOGY | Facility: CLINIC | Age: 68
End: 2023-08-01
Payer: MEDICARE

## 2023-08-01 NOTE — TELEPHONE ENCOUNTER
Placed call to patient to assist. Patient's insurance of out of network. Patient states that he is current battling an abdominal blood clot. Advised patient that it is best that he gets that issue resolved before moving forward with a elective colonoscopy. Patient currently waiting to have his insurance switched back to [employer].

## 2023-08-18 DIAGNOSIS — G57.03 PIRIFORMIS SYNDROME OF BOTH SIDES: ICD-10-CM

## 2023-08-21 NOTE — TELEPHONE ENCOUNTER
Refill Routing Note   Medication(s) are not appropriate for processing by [facility] for the following reason(s):      Medication outside of protocol  Non-participating provider    [facility] action(s):  Route Care Due:  None identified              Appointments  past 12m or future 3m with PCP    Date Provider   Last Visit   6/6/2023 Will Amezcua MD   Next Visit   9/14/2023 Will Amezcua MD   ED visits in past 90 days: 0        Note composed:9:38 AM 08/21/2023

## 2023-08-23 RX ORDER — MELOXICAM 15 MG/1
TABLET ORAL
Qty: 30 TABLET | Refills: 0 | Status: SHIPPED | OUTPATIENT
Start: 2023-08-23 | End: 2023-10-04

## 2023-09-19 ENCOUNTER — HOSPITAL ENCOUNTER (OUTPATIENT)
Dept: RADIOLOGY | Facility: HOSPITAL | Age: 68
Discharge: HOME | End: 2023-09-19
Attending: INTERNAL MEDICINE
Payer: MEDICARE

## 2023-09-19 PROCEDURE — 25500020 PHARM REV CODE 255: Performed by: INTERNAL MEDICINE

## 2023-09-19 PROCEDURE — A9698 NON-RAD CONTRAST MATERIALNOC: HCPCS | Performed by: INTERNAL MEDICINE

## 2023-09-19 PROCEDURE — 71250 CT CHEST ABDOMEN PELVIS WITHOUT CONTRAST(XPD): ICD-10-PCS | Mod: 26,,, | Performed by: RADIOLOGY

## 2023-09-19 PROCEDURE — 74176 CT ABD & PELVIS W/O CONTRAST: CPT | Mod: 26,,, | Performed by: RADIOLOGY

## 2023-09-19 PROCEDURE — 74176 CT ABD & PELVIS W/O CONTRAST: CPT | Mod: TC

## 2023-09-19 PROCEDURE — 71250 CT THORAX DX C-: CPT | Mod: 26,,, | Performed by: RADIOLOGY

## 2023-09-19 PROCEDURE — 74176 CT CHEST ABDOMEN PELVIS WITHOUT CONTRAST(XPD): ICD-10-PCS | Mod: 26,,, | Performed by: RADIOLOGY

## 2023-09-19 RX ADMIN — IOHEXOL 1000 ML: 9 SOLUTION ORAL at 01:09

## 2023-10-04 ENCOUNTER — OFFICE VISIT (OUTPATIENT)
Dept: CARDIOLOGY | Facility: CLINIC | Age: 68
End: 2023-10-04
Payer: MEDICARE

## 2023-10-04 VITALS
BODY MASS INDEX: 21.42 KG/M2 | WEIGHT: 161.63 LBS | HEIGHT: 73 IN | SYSTOLIC BLOOD PRESSURE: 143 MMHG | DIASTOLIC BLOOD PRESSURE: 74 MMHG | OXYGEN SATURATION: 99 % | HEART RATE: 62 BPM

## 2023-10-04 DIAGNOSIS — E78.5 HYPERLIPIDEMIA, UNSPECIFIED HYPERLIPIDEMIA TYPE: ICD-10-CM

## 2023-10-04 DIAGNOSIS — M15.9 PRIMARY OSTEOARTHRITIS INVOLVING MULTIPLE JOINTS: ICD-10-CM

## 2023-10-04 DIAGNOSIS — I73.9 PAD (PERIPHERAL ARTERY DISEASE): ICD-10-CM

## 2023-10-04 DIAGNOSIS — I45.10 RBBB: ICD-10-CM

## 2023-10-04 DIAGNOSIS — Z76.89 ENCOUNTER TO ESTABLISH CARE: ICD-10-CM

## 2023-10-04 DIAGNOSIS — R00.1 BRADYCARDIA: ICD-10-CM

## 2023-10-04 DIAGNOSIS — J43.2 CENTRILOBULAR EMPHYSEMA: ICD-10-CM

## 2023-10-04 DIAGNOSIS — E78.00 HYPERCHOLESTEREMIA: ICD-10-CM

## 2023-10-04 DIAGNOSIS — Z91.89 AT RISK FOR CARDIOVASCULAR EVENT: Primary | ICD-10-CM

## 2023-10-04 DIAGNOSIS — I70.0 ABDOMINAL AORTIC ATHEROSCLEROSIS: ICD-10-CM

## 2023-10-04 DIAGNOSIS — Z87.891 HISTORY OF SMOKING FOR MORE THAN 10 YEARS: ICD-10-CM

## 2023-10-04 PROCEDURE — 99205 PR OFFICE/OUTPT VISIT, NEW, LEVL V, 60-74 MIN: ICD-10-PCS | Mod: 25,S$GLB,, | Performed by: INTERNAL MEDICINE

## 2023-10-04 PROCEDURE — 99999 PR PBB SHADOW E&M-EST. PATIENT-LVL IV: ICD-10-PCS | Mod: PBBFAC,,, | Performed by: INTERNAL MEDICINE

## 2023-10-04 PROCEDURE — 3044F PR MOST RECENT HEMOGLOBIN A1C LEVEL <7.0%: ICD-10-PCS | Mod: CPTII,S$GLB,, | Performed by: INTERNAL MEDICINE

## 2023-10-04 PROCEDURE — 99999 PR PBB SHADOW E&M-EST. PATIENT-LVL IV: CPT | Mod: PBBFAC,,, | Performed by: INTERNAL MEDICINE

## 2023-10-04 PROCEDURE — 99205 OFFICE O/P NEW HI 60 MIN: CPT | Mod: 25,S$GLB,, | Performed by: INTERNAL MEDICINE

## 2023-10-04 PROCEDURE — 3288F FALL RISK ASSESSMENT DOCD: CPT | Mod: CPTII,S$GLB,, | Performed by: INTERNAL MEDICINE

## 2023-10-04 PROCEDURE — 1159F PR MEDICATION LIST DOCUMENTED IN MEDICAL RECORD: ICD-10-PCS | Mod: CPTII,S$GLB,, | Performed by: INTERNAL MEDICINE

## 2023-10-04 PROCEDURE — 3077F PR MOST RECENT SYSTOLIC BLOOD PRESSURE >= 140 MM HG: ICD-10-PCS | Mod: CPTII,S$GLB,, | Performed by: INTERNAL MEDICINE

## 2023-10-04 PROCEDURE — 1101F PT FALLS ASSESS-DOCD LE1/YR: CPT | Mod: CPTII,S$GLB,, | Performed by: INTERNAL MEDICINE

## 2023-10-04 PROCEDURE — 93000 EKG 12-LEAD: ICD-10-PCS | Mod: S$GLB,,, | Performed by: INTERNAL MEDICINE

## 2023-10-04 PROCEDURE — 3044F HG A1C LEVEL LT 7.0%: CPT | Mod: CPTII,S$GLB,, | Performed by: INTERNAL MEDICINE

## 2023-10-04 PROCEDURE — 3078F DIAST BP <80 MM HG: CPT | Mod: CPTII,S$GLB,, | Performed by: INTERNAL MEDICINE

## 2023-10-04 PROCEDURE — 3078F PR MOST RECENT DIASTOLIC BLOOD PRESSURE < 80 MM HG: ICD-10-PCS | Mod: CPTII,S$GLB,, | Performed by: INTERNAL MEDICINE

## 2023-10-04 PROCEDURE — 3008F BODY MASS INDEX DOCD: CPT | Mod: CPTII,S$GLB,, | Performed by: INTERNAL MEDICINE

## 2023-10-04 PROCEDURE — 3077F SYST BP >= 140 MM HG: CPT | Mod: CPTII,S$GLB,, | Performed by: INTERNAL MEDICINE

## 2023-10-04 PROCEDURE — 1159F MED LIST DOCD IN RCRD: CPT | Mod: CPTII,S$GLB,, | Performed by: INTERNAL MEDICINE

## 2023-10-04 PROCEDURE — 93000 ELECTROCARDIOGRAM COMPLETE: CPT | Mod: S$GLB,,, | Performed by: INTERNAL MEDICINE

## 2023-10-04 PROCEDURE — 3008F PR BODY MASS INDEX (BMI) DOCUMENTED: ICD-10-PCS | Mod: CPTII,S$GLB,, | Performed by: INTERNAL MEDICINE

## 2023-10-04 PROCEDURE — 3288F PR FALLS RISK ASSESSMENT DOCUMENTED: ICD-10-PCS | Mod: CPTII,S$GLB,, | Performed by: INTERNAL MEDICINE

## 2023-10-04 PROCEDURE — 1101F PR PT FALLS ASSESS DOC 0-1 FALLS W/OUT INJ PAST YR: ICD-10-PCS | Mod: CPTII,S$GLB,, | Performed by: INTERNAL MEDICINE

## 2023-10-04 RX ORDER — SODIUM PICOSULFATE, MAGNESIUM OXIDE, AND ANHYDROUS CITRIC ACID 10; 3.5; 12 MG/160ML; G/160ML; G/160ML
LIQUID ORAL
COMMUNITY
Start: 2023-06-13 | End: 2023-10-17

## 2023-10-04 RX ORDER — ATORVASTATIN CALCIUM 20 MG/1
20 TABLET, FILM COATED ORAL DAILY
Qty: 90 TABLET | Refills: 3 | Status: SHIPPED | OUTPATIENT
Start: 2023-10-04

## 2023-10-04 RX ORDER — POLYETHYLENE GLYCOL 3350, SODIUM SULFATE ANHYDROUS, SODIUM BICARBONATE, SODIUM CHLORIDE, POTASSIUM CHLORIDE 236; 22.74; 6.74; 5.86; 2.97 G/4L; G/4L; G/4L; G/4L; G/4L
POWDER, FOR SOLUTION ORAL
COMMUNITY
Start: 2023-07-18 | End: 2023-10-16

## 2023-10-04 NOTE — PROGRESS NOTES
Subjective:    Patient ID:  Wing Leon is a 67 y.o. male who presents for evaluation of Establish Care  PCP and referred by Dr. KELLY Prater III  Vascular: Jovan Poole MD  No prior cardiologist  Lives alone with a dog   2 sons, close to [place], in [place]  Last worked in 1993, wife did not want him work, prior longshoreman and construction     Patient is a new patient to me.     Social Determinate of Health: Do you have any problem with the following: none  Health Literacy (understanding): high  Vaccination: up to date none, covid none, covid infection no  Activities: Any Problems or Limitations none and does not work out, do yardwork at least 6 days weekly for 2 hours, no problem and not limited.  Nicotine: former smoker, quit 30 years ago 1993, smoked for 10-15 years, less than 1 ppd  Alcohol: How often? none  Illicit Drugs: none  Cardiac Symptoms: none  Home BP: do not check  Medication Compliance: yes, told he can stop the statin by Dr. KELLY Prater III, off for 2 weeks.  Diet: Regular  Caffeine: How much do you consume a day? none   Labs:   Lab Results   Component Value Date    TSH 1.20 09/13/2023        Lab Results   Component Value Date    HGBA1C 5.5 09/13/2023       Lab Results   Component Value Date    WBC 6.8 09/13/2023    HGB 13.8 09/13/2023    HCT 42.4 09/13/2023    MCV 94.9 09/13/2023     09/13/2023       CMP  Sodium   Date Value Ref Range Status   09/13/2023 141 135 - 146 mmol/L Final     Comment:     Verified by repeat analysis.          Potassium   Date Value Ref Range Status   09/13/2023 4.4 3.5 - 5.3 mmol/L Final     Chloride   Date Value Ref Range Status   09/13/2023 106 98 - 110 mmol/L Final     CO2   Date Value Ref Range Status   09/13/2023 32 20 - 32 mmol/L Final     Comment:     Verified by repeat analysis.          Glucose   Date Value Ref Range Status   09/13/2023 108 65 - 139 mg/dL Final     Comment:               Non-fasting reference interval          BUN   Date Value Ref Range Status  "  09/13/2023 8 7 - 25 mg/dL Final     Creatinine   Date Value Ref Range Status   09/13/2023 1.03 0.70 - 1.35 mg/dL Final     Calcium   Date Value Ref Range Status   09/13/2023 9.6 8.6 - 10.3 mg/dL Final     Total Protein   Date Value Ref Range Status   09/13/2023 6.7 6.1 - 8.1 g/dL Final   09/13/2023 7.0 6.1 - 8.1 g/dL Final     Albumin   Date Value Ref Range Status   09/13/2023 4.2 3.6 - 5.1 g/dL Final     Total Bilirubin   Date Value Ref Range Status   09/13/2023 0.3 0.2 - 1.2 mg/dL Final     Alkaline Phosphatase   Date Value Ref Range Status   06/08/2023 77 55 - 135 U/L Final     AST   Date Value Ref Range Status   09/13/2023 12 10 - 35 U/L Final     ALT   Date Value Ref Range Status   09/13/2023 13 9 - 46 U/L Final     Anion Gap   Date Value Ref Range Status   06/08/2023 10 8 - 16 mmol/L Final     eGFR if    Date Value Ref Range Status   06/29/2022 >60.0 >60 mL/min/1.73 m^2 Final     eGFR if non    Date Value Ref Range Status   06/29/2022 >60.0 >60 mL/min/1.73 m^2 Final     Comment:     Calculation used to obtain the estimated glomerular filtration  rate (eGFR) is the CKD-EPI equation.        @labrcntip(troponini)@  No results found for: "BNP"}   Lab Results   Component Value Date    CHOL 132 09/13/2023    CHOL 146 06/08/2023    CHOL 230 (H) 06/29/2022     Lab Results   Component Value Date    HDL 64 09/13/2023    HDL 57 06/08/2023    HDL 57 06/29/2022     Lab Results   Component Value Date    LDLCALC 54 09/13/2023    LDLCALC 74.6 06/08/2023    LDLCALC 157.4 06/29/2022     Lab Results   Component Value Date    TRIG 64 09/13/2023    TRIG 72 06/08/2023    TRIG 78 06/29/2022     Lab Results   Component Value Date    CHOLHDL 2.1 09/13/2023    CHOLHDL 39.0 06/08/2023    CHOLHDL 24.8 06/29/2022      Last Echo: none  Last stress test: none  Cardiovascular angiogram: none  ECG: SB, rate 58, RBBB  Fundoscopic exam: due    AAM referred for CV evaluation. Significant PAD, no history of " "intervention. Have hypercholesterolemia and was on atorvastatin until 2 weeks ago, advised to stop! Denies any CV symptoms. Active with yard work without problem. No premature family history for CAD nor stroke. 4 brothers all passed before age 70. The oldest with MI at age 69.     Dr. KELLY Prater III noted 9/2023 "Reports pain in both le's  Unchanged  Has seen Dr. Poole    High complexity  Appreciate Dr. Poole  Refer to Dr. Lopez for cardiac eval"    CTA 9/2023 - Impression:     1. Ectasia of the abdominal aorta with extensive soft plaque/intraluminal thrombus resulting in greater than 50% stenosis of the distal abdominal aorta.  2. Focal high-grade stenosis at the origin of the DONNY.  3. Complete occlusion of the right common iliac, external iliac and internal iliac arteries with distal reconstitution of the right CFA.  4. Foci of mild to moderate stenosis involving the distal right SFA.  5. Foci of mild to moderate stenosis involving the left external iliac artery with complete occlusion of the left internal iliac artery.  6. Three-vessel runoff to the bilateral distal-most extremities.  7. Prostatic hypertrophy.  Further evaluation as deemed clinically necessary.  8. Small poorly defined lytic lesions of the pelvis.  This may be related to bone demineralization.  Primary or secondary malignancy not excludable.  Further evaluation with bone scan as deemed clinically necessary.    CT 6/2023 - There is ectasia of the ascending thoracic aorta measuring up to 3.8 cm in diameter.   Centrilobular emphysematous change.  3. Ectasia of the ascending thoracic aorta      Review of Systems   Constitutional: Positive for weight loss. Negative for diaphoresis, fever, malaise/fatigue and night sweats.   HENT:  Negative for nosebleeds and tinnitus.    Eyes:  Negative for visual disturbance.   Cardiovascular:  Negative for chest pain, claudication, cyanosis, dyspnea on exertion, irregular heartbeat, leg swelling, near-syncope, " orthopnea, palpitations and paroxysmal nocturnal dyspnea.   Respiratory:  Negative for cough, shortness of breath, sleep disturbances due to breathing, snoring and wheezing.         [place] score 2   Endocrine: Negative for polydipsia and polyuria.   Hematologic/Lymphatic: Does not bruise/bleed easily.   Skin:  Negative for color change, flushing, nail changes, poor wound healing and suspicious lesions.   Musculoskeletal:  Positive for back pain. Negative for arthritis, falls, gout, joint pain, joint swelling, muscle cramps, muscle weakness and myalgias.   Gastrointestinal:  Negative for heartburn, hematemesis, hematochezia, melena and nausea.   Neurological:  Positive for numbness (both legs). Negative for disturbances in coordination, excessive daytime sleepiness, dizziness, focal weakness, headaches, light-headedness, loss of balance, vertigo and weakness.   Psychiatric/Behavioral:  Negative for depression and substance abuse. The patient does not have insomnia and is not nervous/anxious.         Objective:    Physical Exam  Constitutional:       Appearance: He is well-developed.      Comments: RA O2 sat 99%   HENT:      Head: Normocephalic.   Eyes:      Conjunctiva/sclera: Conjunctivae normal.      Pupils: Pupils are equal, round, and reactive to light.   Neck:      Thyroid: No thyromegaly.      Vascular: No JVD.   Cardiovascular:      Rate and Rhythm: Normal rate and regular rhythm.      Pulses: Intact distal pulses.           Carotid pulses are 1+ on the right side and 1+ on the left side.       Radial pulses are 1+ on the right side and 1+ on the left side.        Dorsalis pedis pulses are 0 on the right side and 1+ on the left side.        Posterior tibial pulses are 0 on the right side and 1+ on the left side.      Heart sounds: Heart sounds are distant. No murmur heard.     No friction rub. No gallop.   Pulmonary:      Effort: Pulmonary effort is normal.      Breath sounds: No rales.      Comments:  "Diminished breath sounds and prolong expiration.  Chest:      Chest wall: No tenderness.   Abdominal:      General: Bowel sounds are normal.      Palpations: Abdomen is soft.      Tenderness: There is no abdominal tenderness.      Comments: Waist 35"   Musculoskeletal:         General: Normal range of motion.      Cervical back: Normal range of motion and neck supple.   Lymphadenopathy:      Cervical: No cervical adenopathy.   Skin:     General: Skin is warm and dry.      Findings: No rash.   Neurological:      Mental Status: He is alert and oriented to person, place, and time.           Assessment:       1. At risk for cardiovascular event    2. Encounter to establish care    3. Hyperlipidemia, unspecified hyperlipidemia type    4. PAD (peripheral artery disease)    5. Hypercholesteremia    6. Primary osteoarthritis involving multiple joints    7. RBBB    8. Centrilobular emphysema    9. History of smoking for more than 10 years, 15 years, < 1 ppd, quit 1993    10. Bradycardia    11. Abdominal aortic atherosclerosis         Plan:       Wing was seen today for establish care.    Diagnoses and all orders for this visit:    At risk for cardiovascular event  -     IN OFFICE EKG 12-LEAD (to [place])  -     atorvastatin (LIPITOR) 20 MG tablet; Take 1 tablet (20 mg total) by mouth once daily.  -     Stress Echo Which stress agent will be used? Treadmill Exercise; Color Flow Doppler? Yes; Future    Encounter to establish care    Hyperlipidemia, unspecified hyperlipidemia type  -     Ambulatory referral/consult to Cardiology  -     atorvastatin (LIPITOR) 20 MG tablet; Take 1 tablet (20 mg total) by mouth once daily.    PAD (peripheral artery disease)  -     IN OFFICE EKG 12-LEAD (to [place])  -     Ambulatory referral/consult to Cardiology  -     atorvastatin (LIPITOR) 20 MG tablet; Take 1 tablet (20 mg total) by mouth once daily.  -     Stress Echo Which stress agent will be used? Treadmill Exercise; Color Flow Doppler? Yes; " Future    Hypercholesteremia  -     Ambulatory referral/consult to Cardiology    Primary osteoarthritis involving multiple joints  -     Ambulatory referral/consult to Cardiology    RBBB  -     Ambulatory referral/consult to Cardiology  -     Stress Echo Which stress agent will be used? Treadmill Exercise; Color Flow Doppler? Yes; Future    Centrilobular emphysema  -     Ambulatory referral/consult to Cardiology    History of smoking for more than 10 years, 15 years, < 1 ppd, quit 1993    Bradycardia    Abdominal aortic atherosclerosis  -     Stress Echo Which stress agent will be used? Treadmill Exercise; Color Flow Doppler? Yes; Future     - All medical issues reviewed, continue current Rx, need to restart atorvastatin.  - Warning signs of MI and stroke given, if symptoms last more than 5 minutes, stop immediately and call 911, then chew 2-4 low-dose ASA (81 mg).   - Need annual fundoscopic examination   - CV status and all medications reviewed, patient acknowledge good understanding.  - Recommend healthy living: healthy diet and regular exercise aiming for fitness, restorative sleep and weight control  - Need good exercise program, 4 key elements: 1. Aerobic (walking, swimming, dancing, jogging, biking, etc, 2. Muscle strengthening / resistance exercise, need to do 2-3 times weekly, 3. Stretching daily, good stretch takes a whole  total minute. 4. Balance exercise daily.   - Instruction for Mediterranean diet and heart healthy exercise given.  - Check home blood pressure, 2 days weekly, do 2 readings within 5 minutes in AM and PM, keep log for review. Target resting BP is less than 130/80.   - Highly recommend 30-60 minutes of exercise / activities daily, can have Sunday off, with 2-3 sessions of muscle strengthening weekly. A  would be very helpful.  - Recommend at least annual cardiovascular evaluation in view of patient's significant risk factors.  - Phone review / encourage use of [facility]        Total time spend including review of record prior to face-to-face visit is 60 minutes. Greater than 50% of the time was spent in counseling and coordination of care. The above assessment and plan have been discussed at length. Referring provider's note reviewed. Labs and procedure over the last 6 months reviewed. Problem List updated. Asked to bring in all active medications / pills bottles with next visit. Will send note to referring / PCP.

## 2023-10-04 NOTE — PATIENT INSTRUCTIONS
Recommended Mediterranean dietEating Heart-Healthy Food: Using the DASH Plan  Eating for your heart doesnt have to be hard or boring. You just need to know how to make healthier choices. The DASH eating plan has been developed to help you do just that. DASH stands for Dietary Approaches to Stop Hypertension. It is a plan that has been proven to be healthier for your heart and to lower your risk for high blood pressure. It can also help lower your risk for cancer, heart disease, osteoporosis, and diabetes.  Choosing from Each Food Group  Choose foods from each of the food groups below each day. Try to get the recommended number of servings for each food group. The serving numbers are based on a diet of 2,000 calories a day. Talk to your doctor if youre unsure about your calorie needs.  Grains   Servings: 7-8 a day  A serving is:  1 slice bread  1 ounce dry cereal  half a cup cooked rice or pasta  Best choices: Whole grains and any grains high in fiber.  Vegetables   Servings: 4-5 a day  A serving is:  1 cup raw leafy vegetable  Half a cup cooked vegetable  Three-quarter cup vegetable juice  Best choices: Fresh or frozen vegetable prepared without too much added salt or fat.    Fruits   Servings: 4-5 a day  A serving is:  Three-quarter cup fruit juice  1 medium fruit  One-quarter cup dried fruit  One-half cup fresh, frozen, or canned fruit  Best choices: A variety of fresh fruits of different colors. Whole fruits are a much better choice than fruit juices.  Low-fat or Fat Free Dairy   Servings: 2-3 a day  A serving is:  8 ounces milk  1 cup yogurt  One and a half ounces cheese  Best choices: Skim or 1% milk, low-fat or fat free yogurt or buttermilk, and low-fat cheeses.       Meat, Poultry, Fish   Servings: 2 or fewer a day  A serving is:  3 ounces cooked meat, poultry, or fish  Best choices: Lean meats and fish. Trim away visible fat. Broil, roast, or boil instead of frying. Remove skin from poultry before eating.   Nuts, Seeds, Beans   Servings: 4-5 a week  A serving is:  One third cup nuts (or one and a half ounces)  2 tablespoons sunflower seeds  Half a cup cooked beans  Best choices: Dry roasted nuts with no salt added, lentils, kidney beans, garbanzo beans, and whole barillas beans.    Fats and Oils   Servings: 2 a day  A serving is:  1 teaspoon vegetable oil  1 teaspoon soft margarine  1 tablespoon low-fat mayonnaise  1 teaspoon regular mayonnaise  2 tablespoons light salad dressing  1 tablespoon regular salad dressing  Best choices: Monounsaturated and polyunsaturated fats such as olive, canola, or safflower oil.  Sweets   Servings: 5 a week or fewer  A serving is:  1 tablespoon sugar, maple syrup, or honey  1 tablespoon jam or jelly  1 half-ounce jelly beans (about 15)  8 ounces lemonade  Best choices: Dried fruit can be a satisfying sweet. Choose low-fat sweets when possible. And watch your serving sizes!       Aerobic Exercise for a Healthy Heart  Exercise is a lot more than an energy booster and a stress reliever. It also strengthens your heart muscle, lowers your blood pressure and blood cholesterol, and burns calories.      Remember, some activity is better than none.     Choose an Aerobic Activity  Choose a nonstop activity that makes your heart and lungs work harder than they do when you rest or walk normally. This aerobic exercise can improve the way your heart and other muscles use oxygen. Make it fun by exercising with a friend and choosing an activity you enjoy. Here are some ideas:  Walking  Swimming  Bicycling  Stair climbing  Dancing  Jogging  Exercise Regularly  If you havent been exercising regularly,  get your doctors okay first. Then start slowly.  Here are some tips:  Begin exercising 3 times a week for 5-10 minutes at a time.  When you feel comfortable, add a few minutes each week.  Slowly build up to exercising 3-4 times each week for 20-40 minutes. Aim for a total of 150 or more minutes a  week.  Be sure to carry your nitroglycerin with you when you exercise.  If you get angina when youre exercising, stop what youre doing, take your nitroglycerin, and call your doctor.  © 4639-8358 [employer], [address] [place] [address], [place] [address]. All rights reserved. This information is not intended as a substitute for professional medical care. Always follow your healthcare professional's instructions.
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
## 2023-10-17 ENCOUNTER — OFFICE VISIT (OUTPATIENT)
Dept: FAMILY MEDICINE | Facility: CLINIC | Age: 68
End: 2023-10-17
Payer: MEDICARE

## 2023-10-17 VITALS
BODY MASS INDEX: 21.07 KG/M2 | DIASTOLIC BLOOD PRESSURE: 80 MMHG | HEIGHT: 73 IN | HEART RATE: 78 BPM | SYSTOLIC BLOOD PRESSURE: 125 MMHG | WEIGHT: 159 LBS

## 2023-10-17 DIAGNOSIS — I74.10 THROMBUS OF AORTA: ICD-10-CM

## 2023-10-17 DIAGNOSIS — N20.0 KIDNEY STONE ON LEFT SIDE: ICD-10-CM

## 2023-10-17 DIAGNOSIS — M54.16 LUMBAR RADICULOPATHY, CHRONIC: ICD-10-CM

## 2023-10-17 DIAGNOSIS — I73.9 PERIPHERAL VASCULAR DISEASE, UNSPECIFIED: ICD-10-CM

## 2023-10-17 DIAGNOSIS — M54.50 CHRONIC MIDLINE LOW BACK PAIN WITHOUT SCIATICA: ICD-10-CM

## 2023-10-17 DIAGNOSIS — I74.5 OCCLUSION OF ILIAC ARTERY: Primary | ICD-10-CM

## 2023-10-17 DIAGNOSIS — G89.29 CHRONIC MIDLINE LOW BACK PAIN WITHOUT SCIATICA: ICD-10-CM

## 2023-10-17 PROCEDURE — 3079F DIAST BP 80-89 MM HG: CPT | Mod: CPTII,S$GLB,, | Performed by: INTERNAL MEDICINE

## 2023-10-17 PROCEDURE — 1126F PR PAIN SEVERITY QUANTIFIED, NO PAIN PRESENT: ICD-10-PCS | Mod: CPTII,S$GLB,, | Performed by: INTERNAL MEDICINE

## 2023-10-17 PROCEDURE — 3008F BODY MASS INDEX DOCD: CPT | Mod: CPTII,S$GLB,, | Performed by: INTERNAL MEDICINE

## 2023-10-17 PROCEDURE — 3074F PR MOST RECENT SYSTOLIC BLOOD PRESSURE < 130 MM HG: ICD-10-PCS | Mod: CPTII,S$GLB,, | Performed by: INTERNAL MEDICINE

## 2023-10-17 PROCEDURE — 3079F PR MOST RECENT DIASTOLIC BLOOD PRESSURE 80-89 MM HG: ICD-10-PCS | Mod: CPTII,S$GLB,, | Performed by: INTERNAL MEDICINE

## 2023-10-17 PROCEDURE — 1159F PR MEDICATION LIST DOCUMENTED IN MEDICAL RECORD: ICD-10-PCS | Mod: CPTII,S$GLB,, | Performed by: INTERNAL MEDICINE

## 2023-10-17 PROCEDURE — 1101F PT FALLS ASSESS-DOCD LE1/YR: CPT | Mod: CPTII,S$GLB,, | Performed by: INTERNAL MEDICINE

## 2023-10-17 PROCEDURE — 1101F PR PT FALLS ASSESS DOC 0-1 FALLS W/OUT INJ PAST YR: ICD-10-PCS | Mod: CPTII,S$GLB,, | Performed by: INTERNAL MEDICINE

## 2023-10-17 PROCEDURE — 99205 OFFICE O/P NEW HI 60 MIN: CPT | Mod: S$GLB,,, | Performed by: INTERNAL MEDICINE

## 2023-10-17 PROCEDURE — 1126F AMNT PAIN NOTED NONE PRSNT: CPT | Mod: CPTII,S$GLB,, | Performed by: INTERNAL MEDICINE

## 2023-10-17 PROCEDURE — 3074F SYST BP LT 130 MM HG: CPT | Mod: CPTII,S$GLB,, | Performed by: INTERNAL MEDICINE

## 2023-10-17 PROCEDURE — 99205 PR OFFICE/OUTPT VISIT, NEW, LEVL V, 60-74 MIN: ICD-10-PCS | Mod: S$GLB,,, | Performed by: INTERNAL MEDICINE

## 2023-10-17 PROCEDURE — 1159F MED LIST DOCD IN RCRD: CPT | Mod: CPTII,S$GLB,, | Performed by: INTERNAL MEDICINE

## 2023-10-17 PROCEDURE — 1160F PR REVIEW ALL MEDS BY PRESCRIBER/CLIN PHARMACIST DOCUMENTED: ICD-10-PCS | Mod: CPTII,S$GLB,, | Performed by: INTERNAL MEDICINE

## 2023-10-17 PROCEDURE — 3288F FALL RISK ASSESSMENT DOCD: CPT | Mod: CPTII,S$GLB,, | Performed by: INTERNAL MEDICINE

## 2023-10-17 PROCEDURE — 3044F PR MOST RECENT HEMOGLOBIN A1C LEVEL <7.0%: ICD-10-PCS | Mod: CPTII,S$GLB,, | Performed by: INTERNAL MEDICINE

## 2023-10-17 PROCEDURE — 3288F PR FALLS RISK ASSESSMENT DOCUMENTED: ICD-10-PCS | Mod: CPTII,S$GLB,, | Performed by: INTERNAL MEDICINE

## 2023-10-17 PROCEDURE — 3008F PR BODY MASS INDEX (BMI) DOCUMENTED: ICD-10-PCS | Mod: CPTII,S$GLB,, | Performed by: INTERNAL MEDICINE

## 2023-10-17 PROCEDURE — 1160F RVW MEDS BY RX/DR IN RCRD: CPT | Mod: CPTII,S$GLB,, | Performed by: INTERNAL MEDICINE

## 2023-10-17 PROCEDURE — 3044F HG A1C LEVEL LT 7.0%: CPT | Mod: CPTII,S$GLB,, | Performed by: INTERNAL MEDICINE

## 2023-10-17 RX ORDER — MELOXICAM 15 MG/1
15 TABLET ORAL DAILY
COMMUNITY
End: 2023-12-21 | Stop reason: SDUPTHER

## 2023-10-20 ENCOUNTER — HOSPITAL ENCOUNTER (OUTPATIENT)
Dept: RADIOLOGY | Facility: HOSPITAL | Age: 68
Discharge: HOME | End: 2023-10-20
Attending: NURSE PRACTITIONER
Payer: MEDICARE

## 2023-10-20 DIAGNOSIS — R59.0 LOCALIZED ENLARGED LYMPH NODES: ICD-10-CM

## 2023-10-21 ENCOUNTER — HOSPITAL ENCOUNTER (OUTPATIENT)
Dept: RADIOLOGY | Facility: HOSPITAL | Age: 68
Discharge: HOME | End: 2023-10-21
Attending: NURSE PRACTITIONER
Payer: MEDICARE

## 2023-10-21 PROCEDURE — 70491 CT SOFT TISSUE NECK WITH CONTRAST: ICD-10-PCS | Mod: 26,,, | Performed by: RADIOLOGY

## 2023-10-21 PROCEDURE — 25500020 PHARM REV CODE 255: Performed by: NURSE PRACTITIONER

## 2023-10-21 PROCEDURE — 70491 CT SOFT TISSUE NECK W/DYE: CPT | Mod: 26,,, | Performed by: RADIOLOGY

## 2023-10-21 PROCEDURE — 70491 CT SOFT TISSUE NECK W/DYE: CPT | Mod: TC

## 2023-10-21 RX ADMIN — IOHEXOL 75 ML: 350 INJECTION, SOLUTION INTRAVENOUS at 08:10

## 2024-01-25 ENCOUNTER — HOSPITAL ENCOUNTER (EMERGENCY)
Facility: HOSPITAL | Age: 69
Discharge: HOME | End: 2024-01-25
Attending: EMERGENCY MEDICINE
Payer: MEDICAID

## 2024-01-25 ENCOUNTER — TELEPHONE (OUTPATIENT)
Dept: UROLOGY | Facility: CLINIC | Age: 69
End: 2024-01-25
Payer: MEDICAID

## 2024-01-25 VITALS
SYSTOLIC BLOOD PRESSURE: 162 MMHG | WEIGHT: 168 LBS | OXYGEN SATURATION: 97 % | TEMPERATURE: 99 F | RESPIRATION RATE: 20 BRPM | DIASTOLIC BLOOD PRESSURE: 96 MMHG | HEART RATE: 68 BPM | BODY MASS INDEX: 22.26 KG/M2 | HEIGHT: 73 IN

## 2024-01-25 DIAGNOSIS — R31.9 HEMATURIA, UNSPECIFIED TYPE: Primary | ICD-10-CM

## 2024-01-25 LAB
ALBUMIN SERPL BCP-MCNC: 4 G/DL (ref 3.5–5.2)
ALP SERPL-CCNC: 78 U/L (ref 55–135)
ALT SERPL W/O P-5'-P-CCNC: 16 U/L (ref 10–44)
ANION GAP SERPL CALC-SCNC: 13 MMOL/L (ref 8–16)
APTT PPP: 25.3 SEC (ref 21–32)
AST SERPL-CCNC: 12 U/L (ref 10–40)
BASOPHILS # BLD AUTO: 0.02 K/UL (ref 0–0.2)
BASOPHILS NFR BLD: 0.3 % (ref 0–1.9)
BILIRUB SERPL-MCNC: 0.3 MG/DL (ref 0.1–1)
BUN SERPL-MCNC: 7 MG/DL (ref 8–23)
CALCIUM SERPL-MCNC: 9 MG/DL (ref 8.7–10.5)
CHLORIDE SERPL-SCNC: 108 MMOL/L (ref 95–110)
CO2 SERPL-SCNC: 23 MMOL/L (ref 23–29)
CREAT SERPL-MCNC: 1.1 MG/DL (ref 0.5–1.4)
DIFFERENTIAL METHOD BLD: ABNORMAL
EOSINOPHIL # BLD AUTO: 0.1 K/UL (ref 0–0.5)
EOSINOPHIL NFR BLD: 1.2 % (ref 0–8)
ERYTHROCYTE [DISTWIDTH] IN BLOOD BY AUTOMATED COUNT: 13.4 % (ref 11.5–14.5)
EST. GFR  (NO RACE VARIABLE): >60 ML/MIN/1.73 M^2
GLUCOSE SERPL-MCNC: 97 MG/DL (ref 70–110)
HCT VFR BLD AUTO: 38.8 % (ref 40–54)
HGB BLD-MCNC: 13.7 G/DL (ref 14–18)
IMM GRANULOCYTES # BLD AUTO: 0 K/UL (ref 0–0.04)
IMM GRANULOCYTES NFR BLD AUTO: 0 % (ref 0–0.5)
INR PPP: 1 (ref 0.8–1.2)
LYMPHOCYTES # BLD AUTO: 2.4 K/UL (ref 1–4.8)
LYMPHOCYTES NFR BLD: 39.5 % (ref 18–48)
MCH RBC QN AUTO: 31 PG (ref 27–31)
MCHC RBC AUTO-ENTMCNC: 35.3 G/DL (ref 32–36)
MCV RBC AUTO: 88 FL (ref 82–98)
MONOCYTES # BLD AUTO: 0.5 K/UL (ref 0.3–1)
MONOCYTES NFR BLD: 8.4 % (ref 4–15)
NEUTROPHILS # BLD AUTO: 3.1 K/UL (ref 1.8–7.7)
NEUTROPHILS NFR BLD: 50.6 % (ref 38–73)
NRBC BLD-RTO: 0 /100 WBC
PLATELET # BLD AUTO: 133 K/UL (ref 150–450)
PMV BLD AUTO: 9.8 FL (ref 9.2–12.9)
POTASSIUM SERPL-SCNC: 4 MMOL/L (ref 3.5–5.1)
PROT SERPL-MCNC: 7.2 G/DL (ref 6–8.4)
PROTHROMBIN TIME: 11.1 SEC (ref 9–12.5)
RBC # BLD AUTO: 4.42 M/UL (ref 4.6–6.2)
SODIUM SERPL-SCNC: 144 MMOL/L (ref 136–145)
WBC # BLD AUTO: 6.08 K/UL (ref 3.9–12.7)

## 2024-01-25 PROCEDURE — 85610 PROTHROMBIN TIME: CPT | Performed by: NURSE PRACTITIONER

## 2024-01-25 PROCEDURE — 99283 EMERGENCY DEPT VISIT LOW MDM: CPT

## 2024-01-25 PROCEDURE — 80053 COMPREHEN METABOLIC PANEL: CPT | Performed by: NURSE PRACTITIONER

## 2024-01-25 PROCEDURE — 85730 THROMBOPLASTIN TIME PARTIAL: CPT | Performed by: NURSE PRACTITIONER

## 2024-01-25 PROCEDURE — 85025 COMPLETE CBC W/AUTO DIFF WBC: CPT | Performed by: NURSE PRACTITIONER

## 2024-01-25 RX ORDER — IBUPROFEN 800 MG/1
800 TABLET ORAL
COMMUNITY
Start: 2023-11-29

## 2024-01-25 RX ORDER — METHOCARBAMOL 500 MG/1
TABLET, FILM COATED ORAL
COMMUNITY
Start: 2023-11-17

## 2024-01-25 RX ORDER — TRAMADOL HYDROCHLORIDE AND ACETAMINOPHEN 37.5; 325 MG/1; MG/1
1 TABLET, FILM COATED ORAL
COMMUNITY
Start: 2023-11-29

## 2024-01-25 RX ORDER — CELECOXIB 100 MG/1
CAPSULE ORAL
COMMUNITY
Start: 2023-11-17

## 2024-01-25 NOTE — TELEPHONE ENCOUNTER
Spoke with patient, explained he is scheduled on Feb 7th with Dr. Barrett. Patient stated he was just contacted by PCP who instructed to go to the ED

## 2024-01-25 NOTE — TELEPHONE ENCOUNTER
----- Message from Crystal Tenorio sent at 1/25/2024 11:13 AM CST -----  Contact: PT  Type:  Sooner Appointment Request    Caller is requesting a sooner appointment.  Caller declined first available appointment listed below.  Caller will not accept being placed on the waitlist and is requesting a message be sent to doctor.    Name of Caller:  PT  When is the first available appointment?  2/27/24  Symptoms: Blood in Urine  Would the patient rather a call back or a response via [facility]? Call back   Best Call Back Number:  [phone]  Additional Information:  PT asking  to be seen ASAP, was told by PCP to see Urologist right away

## 2024-01-25 NOTE — ED NOTES
"Pt states he has an appt with urology on 2/7/2024 but has had "continuous bright red urine" and was told by provider to come to the ED if felt the need to be seen sooner. Provided the pt with urine specimen cup. Pt states he just urinated 30 minutes pta and most likely will not be able to urinate. Denies any pain. NAD noted.   "

## 2024-01-26 NOTE — ED PROVIDER NOTES
Encounter Date: 1/25/2024       History     Chief Complaint   Patient presents with    Hematuria     Blood in urine x 4 days. Denies pain.     POV to ED alone.  Patient complains of hematuria onset 3 days ago.  He denies fall or injury.  He denies any abdominal pain.  He denies painful urination.  He denies history prostate illness or troubles.  He denies fever vomiting or diarrhea.  States that he went to see his provider today.  He was referred to the ED. he has a scheduled urology appointment 02/07/24. 0/10.  No other complaints.  States that he was told he had an abdominal clot in June of last year.  States that he self-medicated and started taken a baby aspirin a day at that time.  His last dose was this morning    The history is provided by the patient.     Review of patient's allergies indicates:   Allergen Reactions    Hydrocodone-acetaminophen Other (See Comments)     Other reaction(s): Other (See Comments)  Patient states he gets sweaty and full of gas when given this medication  Patient states he gets sweaty and full of gas when given this medication       Past Medical History:   Diagnosis Date    Hyperlipidemia     Insomnia      Past Surgical History:   Procedure Laterality Date    HEMORRHOID SURGERY       Family History   Problem Relation Age of Onset    Stroke Mother     Diabetes Mother     Hypertension Mother     Diabetes Father      Social History     Tobacco Use    Smoking status: Former    Smokeless tobacco: Never   Substance Use Topics    Alcohol use: Not Currently    Drug use: Never     Review of Systems   Constitutional:  Negative for chills and fever.   HENT:  Negative for nosebleeds.    Respiratory:  Negative for cough and shortness of breath.    Cardiovascular:  Negative for chest pain.   Gastrointestinal:  Negative for abdominal pain, blood in stool, diarrhea, nausea and vomiting.   Genitourinary:  Positive for hematuria. Negative for dysuria.   All other systems reviewed and are  negative.      Physical Exam     Initial Vitals   BP Pulse Resp Temp SpO2   01/25/24 1607 01/25/24 1604 01/25/24 1604 01/25/24 1604 01/25/24 1604   (!) 162/96 68 20 99.1 °F (37.3 °C) 97 %      MAP       --                Physical Exam    Nursing note and vitals reviewed.  Constitutional: He appears well-developed and well-nourished.   anxious, agitated   HENT:   Head: Normocephalic and atraumatic.   Mouth/Throat: Oropharynx is clear and moist.   Eyes: Pupils are equal, round, and reactive to light.   Neck:   Normal range of motion.  Cardiovascular:  Normal rate and regular rhythm.           Pulmonary/Chest: Breath sounds normal. No respiratory distress.   Abdominal: Abdomen is soft. Bowel sounds are normal. He exhibits no distension. There is no abdominal tenderness.   Musculoskeletal:         General: Normal range of motion.      Cervical back: Normal range of motion.     Neurological: He is alert and oriented to person, place, and time. He has normal strength. GCS score is 15. GCS eye subscore is 4. GCS verbal subscore is 5. GCS motor subscore is 6.   Skin: Skin is warm and dry. Capillary refill takes less than 2 seconds.   Psychiatric: Judgment and thought content normal.   Anxious and agitated         ED Course   Procedures  Labs Reviewed   CBC W/ AUTO DIFFERENTIAL - Abnormal; Notable for the following components:       Result Value    RBC 4.42 (*)     Hemoglobin 13.7 (*)     Hematocrit 38.8 (*)     Platelets 133 (*)     All other components within normal limits   COMPREHENSIVE METABOLIC PANEL - Abnormal; Notable for the following components:    BUN 7 (*)     All other components within normal limits   APTT   PROTIME-INR   URINALYSIS, REFLEX TO URINE CULTURE          Imaging Results    None          Medications - No data to display  Medical Decision Making  Presents for evaluation of hematuria.  Lab work ordered.  Disposition pending  Differentials including but not limited to dehydration, volume depletion,  abnormal electrolytes, UTI, hematuria, kidney stone, abnormal coagulopathy  Discharged home, hematuria per history. Patient arrived anxious and agitated.  Lab work unremarkable.  He states he will be able to urinate until 9:00 a.m. tonight.  He was advised he can wait in the ED until he urinates at 9:00 a.m. tonight.  Patient requests discharge, states that he is not going to sit here and wait all night long.  Patient was discharged per his request.  He was to follow up with Dr. Prater.  To keep his scheduled urology appointment. To return as needed. He agrees with plan of care.  He was advised to stop any aspirin products or any other NSAIDs    Amount and/or Complexity of Data Reviewed  Labs: ordered. Decision-making details documented in ED Course.               ED Course as of 01/25/24 1926   Thu Jan 25, 2024   1758    Comprehensive Metabolic Panel (CMP)    Final result 01/25 1640    BUN 7   Complete Blood Count (CBC)    Final result 01/25 1640    RBC 4.42  Hemoglobin 13.7  Hematocrit 38.8  Platelet Count 133          [CP]      ED Course User Index  [CP] Rita Pringle NP                           Clinical Impression:  Final diagnoses:  [R31.9] Hematuria, unspecified type - PER HISTORY, UA NOT COLLECTED IN THE ED (Primary)          ED Disposition Condition    Discharge Stable          ED Prescriptions    None       Follow-up Information       Follow up With Specialties Details Why Contact Info    Gagandeep Prater III, MD Internal Medicine, Cardiology Call in 3 days  [address] [place] [address] [phone]               Rita Pringle NP  01/25/24 1926

## 2024-01-26 NOTE — DISCHARGE INSTRUCTIONS
Rest, increase fluids, lots of water and liquids.  Keep scheduled appointment with Urology that you have planned in February.  To not taking NSAID products or aspirin products.  Follow up with Dr. Prater as needed.  Return to the ED as needed

## 2024-01-29 ENCOUNTER — TELEPHONE (OUTPATIENT)
Dept: UROLOGY | Facility: CLINIC | Age: 69
End: 2024-01-29
Payer: MEDICAID

## 2024-01-29 NOTE — TELEPHONE ENCOUNTER
----- Message from Funmilayo Berger sent at 1/29/2024  8:27 AM CST -----  Contact: Self  Type:  Sooner Appointment Request  Caller is requesting a sooner appointment.  Caller declined first available appointment listed below.  Caller will not accept being placed on the waitlist and is requesting a message be sent to doctor.    Name of Caller:  Patient  When is the first available appointment?  2/7/24  Symptoms:  blood in urine  Would the patient rather a call back or a response via [facility]? call  Best Call Back Number:  [phone]  Additional Information:  Please call the patient back to schedule/advise. Thank you!

## 2024-02-05 ENCOUNTER — HOSPITAL ENCOUNTER (OUTPATIENT)
Dept: RADIOLOGY | Facility: HOSPITAL | Age: 69
Discharge: HOME | End: 2024-02-05
Attending: UROLOGY
Payer: MEDICARE

## 2024-02-05 DIAGNOSIS — R31.0 GROSS HEMATURIA: ICD-10-CM

## 2024-02-07 ENCOUNTER — HOSPITAL ENCOUNTER (OUTPATIENT)
Dept: RADIOLOGY | Facility: HOSPITAL | Age: 69
Discharge: HOME | End: 2024-02-07
Attending: UROLOGY
Payer: MEDICARE

## 2024-02-07 DIAGNOSIS — R31.0 GROSS HEMATURIA: ICD-10-CM

## 2024-02-08 ENCOUNTER — TELEPHONE (OUTPATIENT)
Dept: UROLOGY | Facility: CLINIC | Age: 69
End: 2024-02-08
Payer: MEDICARE

## 2024-02-08 NOTE — TELEPHONE ENCOUNTER
----- Message from David Jacinto sent at 2/8/2024  2:41 PM CST -----  Type:  Sooner Appointment Request    Caller is requesting a sooner appointment.  Caller declined first available appointment listed below.  Caller will not accept being placed on the waitlist and is requesting a message be sent to doctor.    Name of Caller:  Patient  When is the first available appointment?  Out of Template for all [facility] providers  Symptoms:  Consult for scope  Would the patient rather a call back or a response via [facility]? Call  Best Call Back Number:   [phone]  Additional Information:

## 2024-02-12 ENCOUNTER — OFFICE VISIT (OUTPATIENT)
Dept: UROLOGY | Facility: CLINIC | Age: 69
End: 2024-02-12
Payer: MEDICARE

## 2024-02-12 VITALS — BODY MASS INDEX: 22.26 KG/M2 | WEIGHT: 168 LBS | HEIGHT: 73 IN

## 2024-02-12 DIAGNOSIS — R31.9 PAINLESS HEMATURIA: ICD-10-CM

## 2024-02-12 DIAGNOSIS — Z13.89 SCREENING FOR BLOOD OR PROTEIN IN URINE: ICD-10-CM

## 2024-02-12 DIAGNOSIS — R31.0 GROSS HEMATURIA: Primary | ICD-10-CM

## 2024-02-12 LAB
BILIRUBIN, UA POC OHS: NEGATIVE
BLOOD, UA POC OHS: NEGATIVE
CLARITY, UA POC OHS: CLEAR
COLOR, UA POC OHS: YELLOW
GLUCOSE, UA POC OHS: NEGATIVE
KETONES, UA POC OHS: NEGATIVE
LEUKOCYTES, UA POC OHS: NEGATIVE
NITRITE, UA POC OHS: NEGATIVE
PH, UA POC OHS: 6
PROTEIN, UA POC OHS: ABNORMAL
SPECIFIC GRAVITY, UA POC OHS: 1.02
UROBILINOGEN, UA POC OHS: 2

## 2024-02-12 PROCEDURE — 1101F PT FALLS ASSESS-DOCD LE1/YR: CPT | Mod: CPTII,S$GLB,, | Performed by: UROLOGY

## 2024-02-12 PROCEDURE — 88112 CYTOPATH CELL ENHANCE TECH: CPT | Mod: 26,,, | Performed by: STUDENT IN AN ORGANIZED HEALTH CARE EDUCATION/TRAINING PROGRAM

## 2024-02-12 PROCEDURE — 3008F BODY MASS INDEX DOCD: CPT | Mod: CPTII,S$GLB,, | Performed by: UROLOGY

## 2024-02-12 PROCEDURE — 1160F RVW MEDS BY RX/DR IN RCRD: CPT | Mod: CPTII,S$GLB,, | Performed by: UROLOGY

## 2024-02-12 PROCEDURE — 1126F AMNT PAIN NOTED NONE PRSNT: CPT | Mod: CPTII,S$GLB,, | Performed by: UROLOGY

## 2024-02-12 PROCEDURE — 3288F FALL RISK ASSESSMENT DOCD: CPT | Mod: CPTII,S$GLB,, | Performed by: UROLOGY

## 2024-02-12 PROCEDURE — 81003 URINALYSIS AUTO W/O SCOPE: CPT | Mod: QW,S$GLB,, | Performed by: UROLOGY

## 2024-02-12 PROCEDURE — 99999 PR PBB SHADOW E&M-EST. PATIENT-LVL III: CPT | Mod: PBBFAC,,, | Performed by: UROLOGY

## 2024-02-12 PROCEDURE — 1159F MED LIST DOCD IN RCRD: CPT | Mod: CPTII,S$GLB,, | Performed by: UROLOGY

## 2024-02-12 PROCEDURE — 88112 CYTOPATH CELL ENHANCE TECH: CPT | Performed by: STUDENT IN AN ORGANIZED HEALTH CARE EDUCATION/TRAINING PROGRAM

## 2024-02-12 PROCEDURE — 99204 OFFICE O/P NEW MOD 45 MIN: CPT | Mod: 25,S$GLB,, | Performed by: UROLOGY

## 2024-02-12 NOTE — PROGRESS NOTES
"[facility] New Patient/H&P:    Wing Leon is a 68 y.o. male who presents for gross hematuria.    Patient presented to the emergency department on 1/25/24 with gross hematuria for 4 days with no pain. Urine culture negative. Discharged and instructed to follow up with urology.     CT abdomen pelvis wo contrast on 2/7/24 with no stones, enlarged prostate, right renal cyst and borderline aneurysmal dilatation of the abdominal aorta.     States he followed up with [facility] for evaluation. Was scheduled for cystoscopy, but left as he states he showed up way too early for his appointment. Here to establish care as this is closer to his home. Gross hematuria has since completely resolved. No prior history of gross hematuria.     Urine dipstick with trace protein today. No significant lower urinary tract symptoms.     Smoked 1 ppd for 20 years - quit approximately 30 years ago. Retired from construction.     Denies any fever, chills, flank pain, bone pain,  trauma or history of  malignancy.       PSA  1.0  6/8/23    Urine culture  No growth 1/25/24    Past Medical History:   Diagnosis Date    Hyperlipidemia     Insomnia        Past Surgical History:   Procedure Laterality Date    HEMORRHOID SURGERY         Family History   Problem Relation Age of Onset    Stroke Mother     Diabetes Mother     Hypertension Mother     Diabetes Father        Review of patient's allergies indicates:   Allergen Reactions    Hydrocodone-acetaminophen Other (See Comments)     Other reaction(s): Other (See Comments)  Patient states he gets sweaty and full of gas when given this medication  Patient states he gets sweaty and full of gas when given this medication         Medications Reviewed: see MAR      FOCUSED PHYSICAL EXAM:    There were no vitals filed for this visit.  Body mass index is 22.16 kg/m². Weight: 76.2 kg (168 lb) Height: 6' 1" (185.4 cm)       General: Alert, cooperative, no distress, appears stated " age  Abdomen: Soft, non-tender, no CVA tenderness, non-distended        LABS:    Recent Results (from the past 336 hour(s))   POCT Urinalysis(Instrument)    Collection Time: 02/12/24  1:55 PM   Result Value Ref Range    Color, POC UA Yellow Yellow, Straw, Colorless    Clarity, POC UA Clear Clear    Glucose, POC UA Negative Negative    Bilirubin, POC UA Negative Negative    Ketones, POC UA Negative Negative    Spec Grav POC UA 1.020 1.005 - 1.030    Blood, POC UA Negative Negative    pH, POC UA 6.0 5.0 - 8.0    Protein, POC UA Trace (A) Negative    Urobilinogen, POC UA 2.0 <=1.0    Nitrite, POC UA Negative Negative    WBC, POC UA Negative Negative         Assessment/Diagnosis:    1. Gross hematuria  Procedure Order to Urology    Cytology, Urine      2. Screening for blood or protein in urine  POCT Urinalysis(Instrument)          Plans:    - I spent 45 minutes of the day of this encounter preparing for, treating and managing this patient. We discussed the etiology and management of the patient's gross hematuria. Explained that hematuria is multi-factorial and can be secondary to  cancer, obstructive uropathy, nephritis,  trauma,  infections, thrombosis, nephrolithiasis, bleeding disorders and medications. We discussed hematuria work-up and the benefit of further evaluation with cystourethroscopy to rule out any malignancy, stones or other pathology. All risks, benefits and alternatives discussed at length and all questions answered.    - Scheduled for cystoscopy at the [facility] on 2/21/24.   - Urine cytology today.

## 2024-02-14 DIAGNOSIS — R31.0 GROSS HEMATURIA: Primary | ICD-10-CM

## 2024-02-14 NOTE — PROGRESS NOTES
Procedure Order to Urology [[id number]]    Electronically signed by: Nikita Barrett Jr., MD on 02/12/24 1416 Status: Active   Ordering user: Nikita Barrett Jr., MD 02/12/24 1416 Authorized by: Nikita Barrett Jr., MD   Ordering mode: Standard   Frequency:  02/12/24 -     Diagnoses  Gross hematuria [R31.0]   Questionnaire    Question Answer   Procedure Cystoscopy   Pre-op Diagnosis Gross hematuria   Facility Name: [facility]   Follow-up: 1 week   Order comments: Cystoscopy on 2/21/24 at [facility].   [facility], Please order Urine POCT without micro . Local sedation (no urine Dr Barrett or Dr mendoza)

## 2024-02-20 ENCOUNTER — TELEPHONE (OUTPATIENT)
Dept: UROLOGY | Facility: CLINIC | Age: 69
End: 2024-02-20
Payer: MEDICARE

## 2024-02-20 NOTE — TELEPHONE ENCOUNTER
Spoke w pt was informed of canceled procedure due to insurance   Pt was informed will receive call regarding reschedule in [facility]   Pt abhishek

## 2024-02-21 NOTE — TELEPHONE ENCOUNTER
Spoke with patient and informed him to back to [facility] for Treatment. Patient verbalized understanding.

## 2024-02-25 LAB
FINAL PATHOLOGIC DIAGNOSIS: ABNORMAL
Lab: ABNORMAL

## 2024-03-06 ENCOUNTER — TELEPHONE (OUTPATIENT)
Dept: UROLOGY | Facility: CLINIC | Age: 69
End: 2024-03-06
Payer: MEDICARE

## 2024-03-06 DIAGNOSIS — R31.0 GROSS HEMATURIA: Primary | ICD-10-CM

## 2024-03-06 NOTE — TELEPHONE ENCOUNTER
----- Message from Dillon Barrett sent at 3/6/2024  4:29 PM CST -----  Regarding: ret call  Contact: patient  Type:  Patient Returning Call    Who Called:  patient     Who Left Message for Patient:  Kalee Baxter    Does the patient know what this is regarding?:  yes    Best Call Back Number:  [phone]    Additional Information:  thanks

## 2024-03-06 NOTE — TELEPHONE ENCOUNTER
Spoke with pt. Pt wanted to know if he still can have the procedure done that was canceled two weeks ago due to his insurance. Informed pt that I will forward the message to Dr. Barrett. Pt verbalized  understanding.

## 2024-03-06 NOTE — TELEPHONE ENCOUNTER
Spoke with pt. Pt stated that he will try and see if his pcp can order the cytology in [place] and will call the office to let me know. Pt verbalized understanding.

## 2024-03-08 NOTE — TELEPHONE ENCOUNTER
Spoke with pt. Pt stated that he is ok with Dr. Barrett doing the cysto in our office. Informed pt that he may be billed due to his insurance being out of network. Pt verbalized understanding.

## 2024-03-11 ENCOUNTER — LAB VISIT (OUTPATIENT)
Dept: LAB | Facility: HOSPITAL | Age: 69
End: 2024-03-11
Attending: UROLOGY
Payer: MEDICARE

## 2024-03-11 DIAGNOSIS — R31.0 GROSS HEMATURIA: ICD-10-CM

## 2024-03-11 PROCEDURE — 88112 CYTOPATH CELL ENHANCE TECH: CPT | Mod: 26,,, | Performed by: STUDENT IN AN ORGANIZED HEALTH CARE EDUCATION/TRAINING PROGRAM

## 2024-03-11 PROCEDURE — 88112 CYTOPATH CELL ENHANCE TECH: CPT | Performed by: STUDENT IN AN ORGANIZED HEALTH CARE EDUCATION/TRAINING PROGRAM

## 2024-03-14 LAB
FINAL PATHOLOGIC DIAGNOSIS: NORMAL
Lab: NORMAL

## 2024-04-01 ENCOUNTER — TELEPHONE (OUTPATIENT)
Dept: UROLOGY | Facility: CLINIC | Age: 69
End: 2024-04-01
Payer: MEDICARE

## 2024-04-01 NOTE — TELEPHONE ENCOUNTER
Spoke with pt. Pt wants to proceed with cysto. Informed pt that I will route the message to Dr. Barrett. Pt verbalized understanding.

## 2024-04-01 NOTE — TELEPHONE ENCOUNTER
----- Message from Aneta Chance sent at 4/1/2024 12:30 PM CDT -----  Regarding: schedule  Contact: patient  Type:  Sooner Appointment Request    Caller is requesting a sooner appointment.  Caller declined first available appointment listed below.  Caller will not accept being placed on the waitlist and is requesting a message be sent to doctor.    Name of Caller:  patient  When is the first available appointment?    Symptoms:  cystoscopy  Would the patient rather a call back or a response via [facility]? call  Best Call Back Number:  [phone] (home)     Additional Information:  Please call patient to schedule cystoscopy.  Also patient would like his urine results.  Please call patient to advise.  Thanks!

## 2024-04-02 ENCOUNTER — TELEPHONE (OUTPATIENT)
Dept: UROLOGY | Facility: CLINIC | Age: 69
End: 2024-04-02
Payer: MEDICARE

## 2024-04-02 NOTE — TELEPHONE ENCOUNTER
Spoke with pt. Informed pt that his insurance is out of network. Informed pt to call his insurance company and see what to do. Pt verbalized understanding.

## 2024-04-02 NOTE — TELEPHONE ENCOUNTER
----- Message from Nikita Barrett Jr., MD sent at 4/2/2024  9:36 AM CDT -----  Regarding: RE: schedule  Contact: patient  Patient's cystoscopy was canceled previously because it was not covered by his insurance. He still has the same insurance. Can offer in-clinic cystoscopy, but I'm not sure if that's covered either. Thanks.   ----- Message -----  From: Kalee Baxter MA  Sent: 4/1/2024   1:53 PM CDT  To: Nikita Barrett Jr., MD  Subject: FW: schedule                                     Please advise  ----- Message -----  From: Aneta Fletcher  Sent: 4/1/2024  12:37 PM CDT  To: Arnold Butterfield  Subject: schedule                                         Type:  Sooner Appointment Request    Caller is requesting a sooner appointment.  Caller declined first available appointment listed below.  Caller will not accept being placed on the waitlist and is requesting a message be sent to doctor.    Name of Caller:  patient  When is the first available appointment?    Symptoms:  cystoscopy  Would the patient rather a call back or a response via [facility]? call  Best Call Back Number:  [phone] (home)     Additional Information:  Please call patient to schedule cystoscopy.  Also patient would like his urine results.  Please call patient to advise.  Thanks!

## 2024-04-17 DIAGNOSIS — G47.00 INSOMNIA, UNSPECIFIED TYPE: ICD-10-CM

## 2024-04-25 RX ORDER — TRAZODONE HYDROCHLORIDE 100 MG/1
TABLET ORAL
Qty: 30 TABLET | Refills: 11 | Status: SHIPPED | OUTPATIENT
Start: 2024-04-25